# Patient Record
Sex: MALE | Race: WHITE | NOT HISPANIC OR LATINO | Employment: OTHER | URBAN - METROPOLITAN AREA
[De-identification: names, ages, dates, MRNs, and addresses within clinical notes are randomized per-mention and may not be internally consistent; named-entity substitution may affect disease eponyms.]

---

## 2017-04-26 ENCOUNTER — HISTORICAL (OUTPATIENT)
Dept: ADMINISTRATIVE | Facility: HOSPITAL | Age: 82
End: 2017-04-26

## 2017-07-26 ENCOUNTER — HISTORICAL (OUTPATIENT)
Dept: SURGERY | Facility: HOSPITAL | Age: 82
End: 2017-07-26

## 2017-07-26 LAB
ABS NEUT (OLG): 3 X10(3)/MCL (ref 1.5–6.9)
ALBUMIN SERPL-MCNC: 3.3 GM/DL (ref 3.4–5)
ALBUMIN/GLOB SERPL: 0.9 RATIO
ALP SERPL-CCNC: 67 UNIT/L (ref 30–113)
ALT SERPL-CCNC: 20 UNIT/L (ref 10–45)
APTT PPP: 25.5 SECOND(S) (ref 25–35)
AST SERPL-CCNC: 19 UNIT/L (ref 15–37)
BASOPHILS # BLD AUTO: 0.1 X10(3)/MCL (ref 0–0.1)
BASOPHILS NFR BLD AUTO: 1 % (ref 0–1)
BILIRUB SERPL-MCNC: 0.9 MG/DL (ref 0.1–0.9)
BILIRUBIN DIRECT+TOT PNL SERPL-MCNC: 0.3 MG/DL (ref 0–0.3)
BILIRUBIN DIRECT+TOT PNL SERPL-MCNC: 0.6 MG/DL
BUN SERPL-MCNC: 21 MG/DL (ref 10–20)
CALCIUM SERPL-MCNC: 8.6 MG/DL (ref 8–10.5)
CHLORIDE SERPL-SCNC: 107 MMOL/L (ref 100–108)
CO2 SERPL-SCNC: 34 MMOL/L (ref 21–35)
CREAT SERPL-MCNC: 1.21 MG/DL (ref 0.7–1.3)
EOSINOPHIL # BLD AUTO: 0.4 X10(3)/MCL (ref 0–0.6)
EOSINOPHIL NFR BLD AUTO: 6 % (ref 0–5)
ERYTHROCYTE [DISTWIDTH] IN BLOOD BY AUTOMATED COUNT: 13.6 % (ref 11.5–17)
GLOBULIN SER-MCNC: 3.6 GM/DL
GLUCOSE SERPL-MCNC: 90 MG/DL (ref 75–116)
HCT VFR BLD AUTO: 44.9 % (ref 42–52)
HGB BLD-MCNC: 15.1 GM/DL (ref 14–18)
INR PPP: 1.2 (ref 0–1.2)
LYMPHOCYTES # BLD AUTO: 2 X10(3)/MCL (ref 0.5–4.1)
LYMPHOCYTES NFR BLD AUTO: 31.8 % (ref 15–40)
MCH RBC QN AUTO: 33 PG (ref 27–34)
MCHC RBC AUTO-ENTMCNC: 34 GM/DL (ref 31–36)
MCV RBC AUTO: 97 FL (ref 80–99)
MONOCYTES # BLD AUTO: 0.8 X10(3)/MCL (ref 0–1.1)
MONOCYTES NFR BLD AUTO: 13 % (ref 4–12)
NEUTROPHILS # BLD AUTO: 3 X10(3)/MCL (ref 1.5–6.9)
NEUTROPHILS NFR BLD AUTO: 48 % (ref 43–75)
PLATELET # BLD AUTO: 154 X10(3)/MCL (ref 140–400)
PMV BLD AUTO: 11.9 FL (ref 6.8–10)
POTASSIUM SERPL-SCNC: 4.8 MMOL/L (ref 3.6–5.2)
PROT SERPL-MCNC: 6.9 GM/DL (ref 6.4–8.2)
PROTHROMBIN TIME: 12.1 SECOND(S) (ref 9–12)
RBC # BLD AUTO: 4.63 X10(6)/MCL (ref 4.7–6.1)
SODIUM SERPL-SCNC: 143 MMOL/L (ref 135–145)
WBC # SPEC AUTO: 6.2 X10(3)/MCL (ref 4.5–11.5)

## 2017-07-28 ENCOUNTER — HISTORICAL (OUTPATIENT)
Dept: ANESTHESIOLOGY | Facility: HOSPITAL | Age: 82
End: 2017-07-28

## 2018-05-14 ENCOUNTER — HISTORICAL (OUTPATIENT)
Dept: SURGERY | Facility: HOSPITAL | Age: 83
End: 2018-05-14

## 2018-05-14 LAB
ABS NEUT (OLG): 2.4 X10(3)/MCL (ref 1.5–6.9)
ALBUMIN SERPL-MCNC: 3.6 GM/DL (ref 3.4–5)
ALBUMIN/GLOB SERPL: 1.2 RATIO
ALP SERPL-CCNC: 72 UNIT/L (ref 30–113)
ALT SERPL-CCNC: 35 UNIT/L (ref 10–45)
ANISOCYTOSIS BLD QL SMEAR: ABNORMAL
APTT PPP: 24.7 SECOND(S) (ref 25–35)
AST SERPL-CCNC: 30 UNIT/L (ref 15–37)
BASOPHILS NFR BLD MANUAL: 0 % (ref 0–1)
BILIRUB SERPL-MCNC: 0.6 MG/DL (ref 0.1–0.9)
BILIRUBIN DIRECT+TOT PNL SERPL-MCNC: 0.2 MG/DL (ref 0–0.3)
BILIRUBIN DIRECT+TOT PNL SERPL-MCNC: 0.4 MG/DL
BUN SERPL-MCNC: 25 MG/DL (ref 10–20)
CALCIUM SERPL-MCNC: 8.1 MG/DL (ref 8–10.5)
CHLORIDE SERPL-SCNC: 106 MMOL/L (ref 100–108)
CO2 SERPL-SCNC: 25 MMOL/L (ref 21–35)
CREAT SERPL-MCNC: 1.26 MG/DL (ref 0.7–1.3)
EOSINOPHIL NFR BLD MANUAL: 4 % (ref 0–5)
ERYTHROCYTE [DISTWIDTH] IN BLOOD BY AUTOMATED COUNT: 16.7 % (ref 11.5–17)
GLOBULIN SER-MCNC: 3 GM/DL
GLUCOSE SERPL-MCNC: 87 MG/DL (ref 75–116)
GRANULOCYTES NFR BLD MANUAL: 56 % (ref 47–80)
HCT VFR BLD AUTO: 30.2 % (ref 42–52)
HGB BLD-MCNC: 9.2 GM/DL (ref 14–18)
HYPOCHROMIA BLD QL SMEAR: ABNORMAL
INR PPP: 1 (ref 0–1.2)
LYMPHOCYTES NFR BLD MANUAL: 29 % (ref 15–40)
MACROCYTES BLD QL SMEAR: ABNORMAL
MCH RBC QN AUTO: 28 PG (ref 27–34)
MCHC RBC AUTO-ENTMCNC: 30 GM/DL (ref 31–36)
MCV RBC AUTO: 91 FL (ref 80–99)
MONOCYTES NFR BLD MANUAL: 11 % (ref 4–12)
PLATELET # BLD AUTO: 233 X10(3)/MCL (ref 140–400)
PLATELET # BLD EST: ADEQUATE 10*3/UL
PMV BLD AUTO: 10.8 FL (ref 6.8–10)
POTASSIUM SERPL-SCNC: 4.8 MMOL/L (ref 3.6–5.2)
PROT SERPL-MCNC: 6.6 GM/DL (ref 6.4–8.2)
PROTHROMBIN TIME: 10.9 SECOND(S) (ref 9–12)
RBC # BLD AUTO: 3.31 X10(6)/MCL (ref 4.7–6.1)
RBC MORPH BLD: ABNORMAL
SODIUM SERPL-SCNC: 140 MMOL/L (ref 135–145)
WBC # SPEC AUTO: 5.4 X10(3)/MCL (ref 4.5–11.5)

## 2018-06-18 ENCOUNTER — HISTORICAL (OUTPATIENT)
Dept: ANESTHESIOLOGY | Facility: HOSPITAL | Age: 83
End: 2018-06-18

## 2018-11-15 ENCOUNTER — HISTORICAL (OUTPATIENT)
Dept: LAB | Facility: HOSPITAL | Age: 83
End: 2018-11-15

## 2018-11-15 LAB
COLOR STL: NORMAL
CONSISTENCY STL: NORMAL
HEMOCCULT SP1 STL QL: NEGATIVE
HEMOCCULT SP2 STL QL: NEGATIVE

## 2022-03-23 ENCOUNTER — HISTORICAL (OUTPATIENT)
Dept: RADIOLOGY | Facility: HOSPITAL | Age: 87
End: 2022-03-23

## 2022-03-23 ENCOUNTER — HISTORICAL (OUTPATIENT)
Dept: ADMINISTRATIVE | Facility: HOSPITAL | Age: 87
End: 2022-03-23

## 2022-04-30 NOTE — OP NOTE
ADMITTING DIAGNOSIS:  Melenic stools on Protonix.    PROCEDURE:  Esophagogastroduodenoscopy with biopsy.    POSTOPERATIVE DIAGNOSES:    1. Normal duodenum in all 4 portions and into the jejunum.  2. Mild gastritis of the antrum, fundus, and cardia of the stomach.  3. 1 cm hiatal hernia, Z-line at 44 cm.  4. Normal esophagus, cricopharyngeus muscle, and vocal cords.    INDICATIONS FOR PROCEDURE:  The patient is an 87-year-old male referred by Dr. Gerry Handy for EGD.  Had a proctoscopy done recently for melenic stools.  Was referred by Dr. Munguia to Dr. Handy for bleeding per rectum.  The patient has had PPH stapling in the past, on 11/13/2017.  No more blood was noted at that time and then he started developing these melenic stools and there was concern for possible rebleed.  The patient was not grossly anemic, but there was concern as to his being on a blood thinner and having his bleeding coming from the upper gastrointestinal tract, especially since the proctoscopy was negative.    DESCRIPTION OF PROCEDURE:  The patient underwent sedation and examination of the esophagus, stomach, and duodenum.  The duodenum was normal in all 4 portions and into the jejunum.  The pylorus was intubated without difficulty.  Antrum, fundus, and cardia of stomach had some mild gastritis.  Biopsy was taken with the cold biopsy forceps.  There was a 1 cm hiatal hernia Z-line at 44 cm.  The esophagus, cricopharyngeus muscle, vocal cords were normal throughout.  No other abnormalities were seen.  Overall, the patient did very well, had no problems or difficulties.       I appreciate the consultation referral from Dr. Munguia and Dr. Handy and will notify him of all my findings.        BBS/MODL   DD: 06/18/2018 1716   DT: 06/18/2018 1810  Job # [id number]    cc: Dr. Ole Munguia

## 2022-04-30 NOTE — OP NOTE
ADMITTING DIAGNOSIS:  Bleeding symptomatic internal hemorrhoids, grade 4 type, prolapsing.    PROCEDURE:  PPH stapling of grade 4 prolapsing symptomatic internal hemorrhoids.    BRIEF HISTORY:  The patient is an 86-year-old male referred for bleeding hemorrhoids, had a colonoscopy that confirmed it on 07/18/2017, along with digital exam.  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  He was referred for possibility of hemorrhoidectomy.    PROCEDURE IN DETAIL:  Patient was placed under general anesthetic, prone chente-knife position.  Had an anal dilator inserted with insertion of an anal probe and then pursestring suturing of the anorectal mucosa above the dentate line.  The patient underwent resection with a 33 mm CovPure life renalen PPH stapler using the 2nd rung on the anvil.  The patient had good resection.  A circumferential mucosal proctectomy was performed.  No problems were encountered.  The anorectal tissue had healed and sealed very nicely with the staple line.  A couple of small 2-0 plain gut sutures were used for stitching the staple line.  No other abnormalities were encountered.  Overall the patient did very well, had no problems or difficulties, was awakened and sent to Recovery in good condition.       I appreciate the consultation referral from Dr. Munguia and will notify him of my findings.        BBS/MODL   DD: 07/28/2017 1205   DT: 07/28/2017 1232  Job # [id number]    cc: Dr. Munguia

## 2022-09-16 ENCOUNTER — HOSPITAL ENCOUNTER (INPATIENT)
Facility: HOSPITAL | Age: 87
LOS: 12 days | Discharge: NURSING, REHAB OR LONG-TERM CARE | DRG: 521 | End: 2022-09-28
Attending: INTERNAL MEDICINE | Admitting: INTERNAL MEDICINE
Payer: MEDICARE

## 2022-09-16 DIAGNOSIS — I48.20 CHRONIC A-FIB: ICD-10-CM
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
## 2022-09-16 DIAGNOSIS — S72.009A HIP FRACTURE: ICD-10-CM

## 2022-09-16 DIAGNOSIS — S72.001A CLOSED FRACTURE OF RIGHT HIP, INITIAL ENCOUNTER: ICD-10-CM

## 2022-09-16 DIAGNOSIS — S72.144A CLOSED NONDISPLACED INTERTROCHANTERIC FRACTURE OF RIGHT FEMUR, INITIAL ENCOUNTER: Primary | ICD-10-CM

## 2022-09-16 LAB
ANION GAP SERPL CALC-SCNC: 10 MEQ/L
BASOPHILS # BLD AUTO: 0.07 X10(3)/MCL (ref 0–0.2)
BASOPHILS NFR BLD AUTO: 0.7 %
BUN SERPL-MCNC: 32 MG/DL (ref 8.4–25.7)
CALCIUM SERPL-MCNC: 9.1 MG/DL (ref 8.8–10)
CHLORIDE SERPL-SCNC: 107 MMOL/L (ref 98–111)
CO2 SERPL-SCNC: 27 MMOL/L (ref 23–31)
CREAT SERPL-MCNC: 1.49 MG/DL (ref 0.73–1.18)
CREAT/UREA NIT SERPL: 21
EOSINOPHIL # BLD AUTO: 0.96 X10(3)/MCL (ref 0–0.9)
EOSINOPHIL NFR BLD AUTO: 9.3 %
ERYTHROCYTE [DISTWIDTH] IN BLOOD BY AUTOMATED COUNT: 14.2 % (ref 11.5–17)
GFR SERPLBLD CREATININE-BSD FMLA CKD-EPI: 44 MLS/MIN/1.73/M2
GLUCOSE SERPL-MCNC: 131 MG/DL (ref 75–121)
HCT VFR BLD AUTO: 46.7 % (ref 42–52)
HGB BLD-MCNC: 15.2 GM/DL (ref 14–18)
IMM GRANULOCYTES # BLD AUTO: 0.12 X10(3)/MCL (ref 0–0.04)
IMM GRANULOCYTES NFR BLD AUTO: 1.2 %
INR BLD: 1.15 (ref 0–1.3)
LYMPHOCYTES # BLD AUTO: 1.97 X10(3)/MCL (ref 0.6–4.6)
LYMPHOCYTES NFR BLD AUTO: 19.1 %
MCH RBC QN AUTO: 32.7 PG (ref 27–31)
MCHC RBC AUTO-ENTMCNC: 32.5 MG/DL (ref 33–36)
MCV RBC AUTO: 100.4 FL (ref 80–94)
MONOCYTES # BLD AUTO: 0.79 X10(3)/MCL (ref 0.1–1.3)
MONOCYTES NFR BLD AUTO: 7.7 %
NEUTROPHILS # BLD AUTO: 6.4 X10(3)/MCL (ref 2.1–9.2)
NEUTROPHILS NFR BLD AUTO: 62 %
PLATELET # BLD AUTO: 118 X10(3)/MCL (ref 130–400)
PMV BLD AUTO: 12.5 FL (ref 7.4–10.4)
POTASSIUM SERPL-SCNC: 4.5 MMOL/L (ref 3.5–5.1)
PROTHROMBIN TIME: 14.6 SECONDS (ref 12.5–14.5)
RBC # BLD AUTO: 4.65 X10(6)/MCL (ref 4.7–6.1)
SODIUM SERPL-SCNC: 144 MMOL/L (ref 132–146)
WBC # SPEC AUTO: 10.3 X10(3)/MCL (ref 4.5–11.5)

## 2022-09-16 PROCEDURE — 63600175 PHARM REV CODE 636 W HCPCS: Performed by: INTERNAL MEDICINE

## 2022-09-16 PROCEDURE — 85610 PROTHROMBIN TIME: CPT | Performed by: INTERNAL MEDICINE

## 2022-09-16 PROCEDURE — 36415 COLL VENOUS BLD VENIPUNCTURE: CPT | Performed by: INTERNAL MEDICINE

## 2022-09-16 PROCEDURE — 51702 INSERT TEMP BLADDER CATH: CPT

## 2022-09-16 PROCEDURE — 96376 TX/PRO/DX INJ SAME DRUG ADON: CPT

## 2022-09-16 PROCEDURE — 25000003 PHARM REV CODE 250: Performed by: INTERNAL MEDICINE

## 2022-09-16 PROCEDURE — 96374 THER/PROPH/DIAG INJ IV PUSH: CPT

## 2022-09-16 PROCEDURE — 85025 COMPLETE CBC W/AUTO DIFF WBC: CPT | Performed by: INTERNAL MEDICINE

## 2022-09-16 PROCEDURE — 99285 EMERGENCY DEPT VISIT HI MDM: CPT | Mod: 25

## 2022-09-16 PROCEDURE — 96375 TX/PRO/DX INJ NEW DRUG ADDON: CPT

## 2022-09-16 PROCEDURE — 11000001 HC ACUTE MED/SURG PRIVATE ROOM

## 2022-09-16 PROCEDURE — 80048 BASIC METABOLIC PNL TOTAL CA: CPT | Performed by: INTERNAL MEDICINE

## 2022-09-16 RX ORDER — SODIUM CHLORIDE 9 MG/ML
1000 INJECTION, SOLUTION INTRAVENOUS
Status: COMPLETED | OUTPATIENT
Start: 2022-09-16 | End: 2022-09-16

## 2022-09-16 RX ORDER — FUROSEMIDE 20 MG/1
20 TABLET ORAL EVERY MORNING
Status: ON HOLD | COMMUNITY
Start: 2022-08-17 | End: 2022-09-28

## 2022-09-16 RX ORDER — OXCARBAZEPINE 300 MG/1
300 TABLET, FILM COATED ORAL DAILY
Status: DISCONTINUED | OUTPATIENT
Start: 2022-09-17 | End: 2022-09-16

## 2022-09-16 RX ORDER — OXCARBAZEPINE 150 MG/1
150 TABLET, FILM COATED ORAL DAILY
Status: DISCONTINUED | OUTPATIENT
Start: 2022-09-17 | End: 2022-09-28 | Stop reason: HOSPADM

## 2022-09-16 RX ORDER — PROCHLORPERAZINE EDISYLATE 5 MG/ML
5 INJECTION INTRAMUSCULAR; INTRAVENOUS EVERY 6 HOURS PRN
Status: DISCONTINUED | OUTPATIENT
Start: 2022-09-16 | End: 2022-09-17

## 2022-09-16 RX ORDER — HYDROMORPHONE HYDROCHLORIDE 2 MG/ML
1 INJECTION, SOLUTION INTRAMUSCULAR; INTRAVENOUS; SUBCUTANEOUS
Status: COMPLETED | OUTPATIENT
Start: 2022-09-16 | End: 2022-09-16

## 2022-09-16 RX ORDER — ONDANSETRON 2 MG/ML
4 INJECTION INTRAMUSCULAR; INTRAVENOUS EVERY 8 HOURS PRN
Status: DISCONTINUED | OUTPATIENT
Start: 2022-09-16 | End: 2022-09-28 | Stop reason: HOSPADM

## 2022-09-16 RX ORDER — ONDANSETRON 2 MG/ML
4 INJECTION INTRAMUSCULAR; INTRAVENOUS
Status: COMPLETED | OUTPATIENT
Start: 2022-09-16 | End: 2022-09-16

## 2022-09-16 RX ORDER — OXCARBAZEPINE 150 MG/1
300 TABLET, FILM COATED ORAL 2 TIMES DAILY
Status: ON HOLD | COMMUNITY
Start: 2022-06-20 | End: 2022-09-28

## 2022-09-16 RX ORDER — LORAZEPAM 2 MG/ML
1 INJECTION INTRAMUSCULAR
Status: COMPLETED | OUTPATIENT
Start: 2022-09-16 | End: 2022-09-16

## 2022-09-16 RX ORDER — AMOXICILLIN 250 MG
1 CAPSULE ORAL 2 TIMES DAILY
Status: DISCONTINUED | OUTPATIENT
Start: 2022-09-16 | End: 2022-09-21

## 2022-09-16 RX ORDER — HYDROMORPHONE HYDROCHLORIDE 2 MG/ML
1 INJECTION, SOLUTION INTRAMUSCULAR; INTRAVENOUS; SUBCUTANEOUS EVERY 4 HOURS PRN
Status: DISCONTINUED | OUTPATIENT
Start: 2022-09-16 | End: 2022-09-17

## 2022-09-16 RX ORDER — SODIUM CHLORIDE 9 MG/ML
1000 INJECTION, SOLUTION INTRAVENOUS CONTINUOUS
Status: DISCONTINUED | OUTPATIENT
Start: 2022-09-16 | End: 2022-09-17

## 2022-09-16 RX ORDER — HYDROMORPHONE HYDROCHLORIDE 2 MG/ML
1 INJECTION, SOLUTION INTRAMUSCULAR; INTRAVENOUS; SUBCUTANEOUS
Status: DISCONTINUED | OUTPATIENT
Start: 2022-09-16 | End: 2022-09-16

## 2022-09-16 RX ORDER — OXYCODONE HYDROCHLORIDE 5 MG/1
5 TABLET ORAL EVERY 4 HOURS PRN
Status: DISCONTINUED | OUTPATIENT
Start: 2022-09-16 | End: 2022-09-28 | Stop reason: HOSPADM

## 2022-09-16 RX ORDER — TALC
6 POWDER (GRAM) TOPICAL NIGHTLY PRN
Status: DISCONTINUED | OUTPATIENT
Start: 2022-09-16 | End: 2022-09-28 | Stop reason: HOSPADM

## 2022-09-16 RX ORDER — SODIUM CHLORIDE 0.9 % (FLUSH) 0.9 %
10 SYRINGE (ML) INJECTION
Status: DISCONTINUED | OUTPATIENT
Start: 2022-09-16 | End: 2022-09-28 | Stop reason: HOSPADM

## 2022-09-16 RX ORDER — ENOXAPARIN SODIUM 100 MG/ML
30 INJECTION SUBCUTANEOUS EVERY 24 HOURS
Status: DISCONTINUED | OUTPATIENT
Start: 2022-09-16 | End: 2022-09-28 | Stop reason: HOSPADM

## 2022-09-16 RX ADMIN — LORAZEPAM 1 MG: 2 INJECTION INTRAMUSCULAR; INTRAVENOUS at 09:09

## 2022-09-16 RX ADMIN — ONDANSETRON 4 MG: 2 INJECTION INTRAMUSCULAR; INTRAVENOUS at 08:09

## 2022-09-16 RX ADMIN — HYDROMORPHONE HYDROCHLORIDE 1 MG: 2 INJECTION, SOLUTION INTRAMUSCULAR; INTRAVENOUS; SUBCUTANEOUS at 08:09

## 2022-09-16 RX ADMIN — SODIUM CHLORIDE 1000 ML: 9 INJECTION, SOLUTION INTRAVENOUS at 08:09

## 2022-09-17 ENCOUNTER — ANESTHESIA (OUTPATIENT)
Dept: SURGERY | Facility: HOSPITAL | Age: 87
DRG: 521 | End: 2022-09-17
Payer: MEDICARE

## 2022-09-17 ENCOUNTER — ANESTHESIA EVENT (OUTPATIENT)
Dept: SURGERY | Facility: HOSPITAL | Age: 87
DRG: 521 | End: 2022-09-17
Payer: MEDICARE

## 2022-09-17 PROBLEM — I48.0 PAROXYSMAL A-FIB: Status: ACTIVE | Noted: 2022-09-17

## 2022-09-17 PROBLEM — R21 RASH: Status: ACTIVE | Noted: 2022-09-17

## 2022-09-17 LAB
ABORH RETYPE: NORMAL
APPEARANCE UR: CLEAR
BACTERIA #/AREA URNS AUTO: NORMAL /HPF
BILIRUB UR QL STRIP.AUTO: NEGATIVE MG/DL
COLOR UR AUTO: YELLOW
GLUCOSE UR QL STRIP.AUTO: NEGATIVE MG/DL
GROUP & RH: NORMAL
INDIRECT COOMBS GEL: NORMAL
KETONES UR QL STRIP.AUTO: NEGATIVE MG/DL
LEUKOCYTE ESTERASE UR QL STRIP.AUTO: NEGATIVE UNIT/L
NITRITE UR QL STRIP.AUTO: NEGATIVE
PH UR STRIP.AUTO: 5.5 [PH]
PROT UR QL STRIP.AUTO: 30 MG/DL
RBC #/AREA URNS AUTO: NORMAL /HPF
RBC UR QL AUTO: ABNORMAL UNIT/L
SP GR UR STRIP.AUTO: 1.02
SQUAMOUS #/AREA URNS AUTO: NORMAL /HPF
UROBILINOGEN UR STRIP-ACNC: 1 MG/DL
WBC #/AREA URNS AUTO: NORMAL /HPF

## 2022-09-17 PROCEDURE — 37000009 HC ANESTHESIA EA ADD 15 MINS: Performed by: SPECIALIST

## 2022-09-17 PROCEDURE — 11000001 HC ACUTE MED/SURG PRIVATE ROOM

## 2022-09-17 PROCEDURE — 63600175 PHARM REV CODE 636 W HCPCS: Performed by: INTERNAL MEDICINE

## 2022-09-17 PROCEDURE — C1776 JOINT DEVICE (IMPLANTABLE): HCPCS | Performed by: SPECIALIST

## 2022-09-17 PROCEDURE — 71000033 HC RECOVERY, INTIAL HOUR: Performed by: SPECIALIST

## 2022-09-17 PROCEDURE — 99900035 HC TECH TIME PER 15 MIN (STAT)

## 2022-09-17 PROCEDURE — 36415 COLL VENOUS BLD VENIPUNCTURE: CPT | Performed by: SPECIALIST

## 2022-09-17 PROCEDURE — 25000003 PHARM REV CODE 250: Performed by: NURSE ANESTHETIST, CERTIFIED REGISTERED

## 2022-09-17 PROCEDURE — 36415 COLL VENOUS BLD VENIPUNCTURE: CPT | Performed by: INTERNAL MEDICINE

## 2022-09-17 PROCEDURE — 86901 BLOOD TYPING SEROLOGIC RH(D): CPT | Performed by: SPECIALIST

## 2022-09-17 PROCEDURE — 93005 ELECTROCARDIOGRAM TRACING: CPT

## 2022-09-17 PROCEDURE — 81001 URINALYSIS AUTO W/SCOPE: CPT | Performed by: INTERNAL MEDICINE

## 2022-09-17 PROCEDURE — 63600175 PHARM REV CODE 636 W HCPCS: Performed by: SPECIALIST

## 2022-09-17 PROCEDURE — 25000003 PHARM REV CODE 250: Performed by: SPECIALIST

## 2022-09-17 PROCEDURE — 37000008 HC ANESTHESIA 1ST 15 MINUTES: Performed by: SPECIALIST

## 2022-09-17 PROCEDURE — 36000710: Performed by: SPECIALIST

## 2022-09-17 PROCEDURE — 63600175 PHARM REV CODE 636 W HCPCS: Performed by: NURSE ANESTHETIST, CERTIFIED REGISTERED

## 2022-09-17 PROCEDURE — 36000711: Performed by: SPECIALIST

## 2022-09-17 PROCEDURE — 25000003 PHARM REV CODE 250: Performed by: INTERNAL MEDICINE

## 2022-09-17 PROCEDURE — 94761 N-INVAS EAR/PLS OXIMETRY MLT: CPT

## 2022-09-17 PROCEDURE — 27201423 OPTIME MED/SURG SUP & DEVICES STERILE SUPPLY: Performed by: SPECIALIST

## 2022-09-17 PROCEDURE — 27000221 HC OXYGEN, UP TO 24 HOURS

## 2022-09-17 DEVICE — IMPLANTABLE DEVICE: Type: IMPLANTABLE DEVICE | Site: HIP | Status: FUNCTIONAL

## 2022-09-17 DEVICE — STEM FEM TAPER HIP SZ 6: Type: IMPLANTABLE DEVICE | Site: HIP | Status: FUNCTIONAL

## 2022-09-17 DEVICE — SPACER FEM -0 12/14 TAPER END: Type: IMPLANTABLE DEVICE | Site: HIP | Status: FUNCTIONAL

## 2022-09-17 RX ORDER — CEFAZOLIN SODIUM 2 G/50ML
2 SOLUTION INTRAVENOUS ONCE
Status: COMPLETED | OUTPATIENT
Start: 2022-09-17 | End: 2022-09-17

## 2022-09-17 RX ORDER — POLYETHYLENE GLYCOL 3350 17 G/17G
17 POWDER, FOR SOLUTION ORAL DAILY
Status: DISCONTINUED | OUTPATIENT
Start: 2022-09-17 | End: 2022-09-28 | Stop reason: HOSPADM

## 2022-09-17 RX ORDER — LOPERAMIDE HYDROCHLORIDE 2 MG/1
4 CAPSULE ORAL ONCE
Status: DISCONTINUED | OUTPATIENT
Start: 2022-09-17 | End: 2022-09-22

## 2022-09-17 RX ORDER — ONDANSETRON 2 MG/ML
4 INJECTION INTRAMUSCULAR; INTRAVENOUS EVERY 12 HOURS PRN
Status: DISCONTINUED | OUTPATIENT
Start: 2022-09-17 | End: 2022-09-28 | Stop reason: HOSPADM

## 2022-09-17 RX ORDER — SODIUM CHLORIDE 0.9 % (FLUSH) 0.9 %
3 SYRINGE (ML) INJECTION
Status: DISCONTINUED | OUTPATIENT
Start: 2022-09-17 | End: 2022-09-28 | Stop reason: HOSPADM

## 2022-09-17 RX ORDER — DEXAMETHASONE SODIUM PHOSPHATE 4 MG/ML
INJECTION, SOLUTION INTRA-ARTICULAR; INTRALESIONAL; INTRAMUSCULAR; INTRAVENOUS; SOFT TISSUE
Status: DISCONTINUED | OUTPATIENT
Start: 2022-09-17 | End: 2022-09-17

## 2022-09-17 RX ORDER — HYDROCODONE BITARTRATE AND ACETAMINOPHEN 5; 325 MG/1; MG/1
1 TABLET ORAL EVERY 4 HOURS PRN
Status: DISCONTINUED | OUTPATIENT
Start: 2022-09-17 | End: 2022-09-28 | Stop reason: HOSPADM

## 2022-09-17 RX ORDER — ACETAMINOPHEN 325 MG/1
650 TABLET ORAL EVERY 4 HOURS PRN
Status: DISCONTINUED | OUTPATIENT
Start: 2022-09-17 | End: 2022-09-28 | Stop reason: HOSPADM

## 2022-09-17 RX ORDER — HYDROCODONE BITARTRATE AND ACETAMINOPHEN 10; 325 MG/1; MG/1
1 TABLET ORAL EVERY 6 HOURS PRN
Status: DISCONTINUED | OUTPATIENT
Start: 2022-09-17 | End: 2022-09-22

## 2022-09-17 RX ORDER — BUPIVACAINE HYDROCHLORIDE 2.5 MG/ML
INJECTION, SOLUTION EPIDURAL; INFILTRATION; INTRACAUDAL
Status: DISCONTINUED | OUTPATIENT
Start: 2022-09-17 | End: 2022-09-17 | Stop reason: HOSPADM

## 2022-09-17 RX ORDER — ONDANSETRON 2 MG/ML
INJECTION INTRAMUSCULAR; INTRAVENOUS
Status: DISCONTINUED | OUTPATIENT
Start: 2022-09-17 | End: 2022-09-17

## 2022-09-17 RX ORDER — FENTANYL CITRATE 50 UG/ML
INJECTION, SOLUTION INTRAMUSCULAR; INTRAVENOUS
Status: DISCONTINUED | OUTPATIENT
Start: 2022-09-17 | End: 2022-09-17

## 2022-09-17 RX ORDER — CEFAZOLIN SODIUM 2 G/50ML
2 SOLUTION INTRAVENOUS
Status: COMPLETED | OUTPATIENT
Start: 2022-09-17 | End: 2022-09-17

## 2022-09-17 RX ORDER — MUPIROCIN 20 MG/G
OINTMENT TOPICAL 2 TIMES DAILY
Status: ACTIVE | OUTPATIENT
Start: 2022-09-17 | End: 2022-09-22

## 2022-09-17 RX ORDER — ACETAMINOPHEN 10 MG/ML
1000 INJECTION, SOLUTION INTRAVENOUS ONCE
Status: COMPLETED | OUTPATIENT
Start: 2022-09-17 | End: 2022-09-17

## 2022-09-17 RX ORDER — ACETAMINOPHEN 10 MG/ML
INJECTION, SOLUTION INTRAVENOUS
Status: DISCONTINUED | OUTPATIENT
Start: 2022-09-17 | End: 2022-09-17

## 2022-09-17 RX ORDER — DIPHENHYDRAMINE HYDROCHLORIDE 50 MG/ML
25 INJECTION INTRAMUSCULAR; INTRAVENOUS EVERY 6 HOURS PRN
Status: CANCELLED | OUTPATIENT
Start: 2022-09-17

## 2022-09-17 RX ORDER — SODIUM CHLORIDE 9 MG/ML
1000 INJECTION, SOLUTION INTRAVENOUS CONTINUOUS
Status: ACTIVE | OUTPATIENT
Start: 2022-09-17 | End: 2022-09-17

## 2022-09-17 RX ORDER — FAMOTIDINE 10 MG/ML
20 INJECTION INTRAVENOUS DAILY
Status: DISCONTINUED | OUTPATIENT
Start: 2022-09-17 | End: 2022-09-28 | Stop reason: HOSPADM

## 2022-09-17 RX ORDER — BISACODYL 10 MG
10 SUPPOSITORY, RECTAL RECTAL DAILY PRN
Status: DISCONTINUED | OUTPATIENT
Start: 2022-09-17 | End: 2022-09-28 | Stop reason: HOSPADM

## 2022-09-17 RX ORDER — CEFAZOLIN SODIUM 2 G/50ML
2 SOLUTION INTRAVENOUS
Status: DISCONTINUED | OUTPATIENT
Start: 2022-09-17 | End: 2022-09-17

## 2022-09-17 RX ORDER — PROPOFOL 10 MG/ML
VIAL (ML) INTRAVENOUS
Status: DISCONTINUED | OUTPATIENT
Start: 2022-09-17 | End: 2022-09-17

## 2022-09-17 RX ORDER — ONDANSETRON 2 MG/ML
4 INJECTION INTRAMUSCULAR; INTRAVENOUS DAILY PRN
Status: CANCELLED | OUTPATIENT
Start: 2022-09-17

## 2022-09-17 RX ORDER — TRANEXAMIC ACID 100 MG/ML
INJECTION, SOLUTION INTRAVENOUS
Status: DISCONTINUED | OUTPATIENT
Start: 2022-09-17 | End: 2022-09-17

## 2022-09-17 RX ORDER — HYDROMORPHONE HYDROCHLORIDE 2 MG/ML
0.2 INJECTION, SOLUTION INTRAMUSCULAR; INTRAVENOUS; SUBCUTANEOUS EVERY 5 MIN PRN
Status: CANCELLED | OUTPATIENT
Start: 2022-09-17

## 2022-09-17 RX ORDER — SODIUM CHLORIDE, SODIUM LACTATE, POTASSIUM CHLORIDE, CALCIUM CHLORIDE 600; 310; 30; 20 MG/100ML; MG/100ML; MG/100ML; MG/100ML
INJECTION, SOLUTION INTRAVENOUS CONTINUOUS
Status: DISCONTINUED | OUTPATIENT
Start: 2022-09-17 | End: 2022-09-21

## 2022-09-17 RX ADMIN — MUPIROCIN: 20 OINTMENT TOPICAL at 09:09

## 2022-09-17 RX ADMIN — ENOXAPARIN SODIUM 30 MG: 30 INJECTION SUBCUTANEOUS at 04:09

## 2022-09-17 RX ADMIN — TRANEXAMIC ACID 1000 MG: 100 INJECTION, SOLUTION INTRAVENOUS at 11:09

## 2022-09-17 RX ADMIN — PROPOFOL 50 MG: 10 INJECTION, EMULSION INTRAVENOUS at 11:09

## 2022-09-17 RX ADMIN — BUPIVACAINE HYDROCHLORIDE 30 ML: 2.5 INJECTION, SOLUTION EPIDURAL; INFILTRATION; INTRACAUDAL; PERINEURAL at 11:09

## 2022-09-17 RX ADMIN — ACETAMINOPHEN 1000 MG: 10 INJECTION, SOLUTION INTRAVENOUS at 11:09

## 2022-09-17 RX ADMIN — SODIUM CHLORIDE, POTASSIUM CHLORIDE, SODIUM LACTATE AND CALCIUM CHLORIDE: 600; 310; 30; 20 INJECTION, SOLUTION INTRAVENOUS at 03:09

## 2022-09-17 RX ADMIN — CEFAZOLIN SODIUM 2 G: 2 SOLUTION INTRAVENOUS at 11:09

## 2022-09-17 RX ADMIN — FAMOTIDINE 20 MG: 10 INJECTION, SOLUTION INTRAVENOUS at 08:09

## 2022-09-17 RX ADMIN — HYDROCODONE BITARTRATE AND ACETAMINOPHEN 1 TABLET: 10; 325 TABLET ORAL at 11:09

## 2022-09-17 RX ADMIN — CEFAZOLIN SODIUM 2 G: 2 SOLUTION INTRAVENOUS at 10:09

## 2022-09-17 RX ADMIN — ACETAMINOPHEN 1000 MG: 10 INJECTION INTRAVENOUS at 03:09

## 2022-09-17 RX ADMIN — FENTANYL CITRATE 50 MCG: 50 INJECTION INTRAMUSCULAR; INTRAVENOUS at 11:09

## 2022-09-17 RX ADMIN — ONDANSETRON 4 MG: 2 INJECTION INTRAMUSCULAR; INTRAVENOUS at 11:09

## 2022-09-17 RX ADMIN — DEXAMETHASONE SODIUM PHOSPHATE 4 MG: 4 INJECTION, SOLUTION INTRA-ARTICULAR; INTRALESIONAL; INTRAMUSCULAR; INTRAVENOUS; SOFT TISSUE at 11:09

## 2022-09-17 NOTE — H&P
[facility] - Medical Surgical [facility] Medicine  History & Physical    Patient Name: Blaze Turner  MRN: [id number]  Patient Class: IP- Inpatient  Admission Date: 9/16/2022  Attending Physician: Cyndy Franco MD  Primary Care Provider: Gaudencio Munguia MD         Patient information was obtained from via telemed    Subjective:     Principal Problem:Closed fracture of right hip    Chief Complaint:   Chief Complaint   Patient presents with    Fall     Pt fell earlier today, initially c/o knee pain, currently c/o right hip pain. No shortening or rotation noted.         HPI: 89-year-old male with hx Dementia,  HTN, Trigeminal neuralgia, Afib, CKD stage 3 baseline Cr 1.5, brought in by his son after fall at home patient states he could not bear weight he was complaining of significant right hip pain so the son brought him to the emergency department.  No LOC, no chest pain, no F/C, no chest pain per daughter. PT takesTrileptal 150 mg daily per daughter. Pt has developed rash for past 5 days per daughter who stays with him. Pt was scratching and agitated he was give Ativan in Ed, now sleeping. Daughter at bedside. Pt is DNR per daughter          Past Medical History:   Diagnosis Date    Trigeminal neuralgia        History reviewed. No pertinent surgical history.    Review of patient's allergies indicates:  No Known Allergies    No current facility-administered medications on file prior to encounter.     Current Outpatient Medications on File Prior to Encounter   Medication Sig    furosemide (LASIX) 20 MG tablet Take 20 mg by mouth every morning.    OXcarbazepine (TRILEPTAL) 150 MG Tab Take 300 mg by mouth 2 (two) times a day.     Family History    None       Tobacco Use    Smoking status: Never    Smokeless tobacco: Never   Substance and Sexual Activity    Alcohol use: Never    Drug use: Never    Sexual activity: Not on file     Review of Systems   Unable to perform ROS: Patient unresponsive    Objective:     Vital Signs (Most Recent):  Temp: 98.2 °F (36.8 °C) (09/16/22 2019)  Pulse: 97 (09/16/22 2215)  Resp: 16 (09/16/22 2215)  BP: 124/68 (09/16/22 2200)  SpO2: 99 % (09/16/22 2215) Vital Signs (24h Range):  Temp:  [98.2 °F (36.8 °C)] 98.2 °F (36.8 °C)  Pulse:  [] 97  Resp:  [14-20] 16  SpO2:  [87 %-100 %] 99 %  BP: (124-165)/() 124/68     Weight: 67.6 kg (149 lb)  Body mass index is 21.38 kg/m².    Physical Exam  Constitutional:       Appearance: Normal appearance.      Comments: sleeping   HENT:      Head: Normocephalic and atraumatic.      Nose: Nose normal.   Cardiovascular:      Rate and Rhythm: Normal rate. Rhythm irregular.      Pulses: Normal pulses.      Heart sounds: Normal heart sounds.   Pulmonary:      Effort: Pulmonary effort is normal.      Comments: Diminished   Abdominal:      General: Abdomen is flat. Bowel sounds are normal.      Palpations: Abdomen is soft.   Musculoskeletal:      Cervical back: Normal range of motion.   Skin:     General: Skin is warm and dry.   Neurological:      Comments: Sleeping not following commend           Significant Labs: All pertinent labs within the past 24 hours have been reviewed.  ABGs: No results for input(s): PH, PCO2, HCO3, POCSATURATED, BE, TOTALHB, COHB, METHB, O2HB, POCFIO2, PO2 in the last 48 hours.  Bilirubin: No results for input(s): BILIDIR, BILIRUBINTOT, BILITOT in the last 720 hours.  Blood Culture: No results for input(s): LABBLOO in the last 48 hours.  BMP:   Recent Labs   Lab 09/16/22 2035      K 4.5   CO2 27   BUN 32.0*   CREATININE 1.49*   CALCIUM 9.1     CBC:   Recent Labs   Lab 09/16/22 2035   WBC 10.3   HGB 15.2   HCT 46.7   *     CMP:   Recent Labs   Lab 09/16/22 2035      K 4.5   CO2 27   BUN 32.0*   CREATININE 1.49*   CALCIUM 9.1     Cardiac Markers: No results for input(s): CKMB, MYOGLOBIN, BNP, TROPISTAT in the last 48 hours.  Coagulation:   Recent Labs   Lab 09/16/22 2035   INR 1.15      Lactic Acid: No results for input(s): LACTATE in the last 48 hours.  Lipase: No results for input(s): LIPASE in the last 48 hours.  Lipid Panel: No results for input(s): CHOL, HDL, LDLCALC, TRIG, CHOLHDL in the last 48 hours.  Magnesium: No results for input(s): MG in the last 48 hours.  POCT Glucose: No results for input(s): POCTGLUCOSE in the last 48 hours.  Prealbumin: No results for input(s): PREALBUMIN in the last 48 hours.  Respiratory Culture: No results for input(s): GSRESP, RESPIRATORYC in the last 48 hours.  Troponin: No results for input(s): TROPONINI in the last 48 hours.  TSH: No results for input(s): TSH in the last 4320 hours.  Urine Studies: No results for input(s): COLORU, APPEARANCEUA, PHUR, SPECGRAV, PROTEINUA, GLUCUA, KETONESU, BILIRUBINUA, OCCULTUA, NITRITE, UROBILINOGEN, LEUKOCYTESUR, RBCUA, WBCUA, BACTERIA, SQUAMEPITHEL, HYALINECASTS in the last 48 hours.    Invalid input(s): WRIGHTSUR    Significant Imaging:   Hip Xray  Impacted Rt intertrochanteric  fracture    Assessment/Plan:     * Closed fracture of right hip  Keep NPO, Continue with pain management. NPO after midnight. Ortho consulted for Surgery in AM      Rash  Likely due to meds, ? Trileptal, may apply cortisol cream PRN      Paroxysmal A-fib  Not on any meds, rate controlled      VTE Risk Mitigation (From admission, onward)         Ordered     enoxaparin injection 30 mg  Daily         09/16/22 2307     IP VTE HIGH RISK PATIENT  Once         09/16/22 2307     Place EUGENIA hose  Until discontinued         09/16/22 2307              CKD stage 3 at baseline  Trigeminal Neuralgia Continue Trileptal for now    Pt is DNR, SDM is his daughter Hiwot Herring  Pt is Seen and examined via telemed. Pt is in [facility]. I am in [place], [place]. Nursing staff assisted with evaluation of the patient. Software Audio/ Video   Pt is being admitted as an inpatient to the hispitalist service for further eval and treat    Cyndy Franco,  MD  Department of Hospital Medicine   [facility] - Medical Surgical Unit

## 2022-09-17 NOTE — ASSESSMENT & PLAN NOTE
Detailed options have been discussed with the family.  Given the fact he is still ambulatory and having pain.  And will offer him surgery of the right hip.  I recommend endoprosthesis versus percutaneous pinning because of his dementia.  They consented and understand the risk of surgery not excluding infection, bleeding, pain, scarring, pulmonary embolism, even death.  We will proceed with right hip endoprosthesis today.

## 2022-09-17 NOTE — HOSPITAL COURSE
89-year-old male admitted last night with a right impacted femoral neck fracture.  Does have dementia but is still ambulatory.  He is able to feed himself.  I have discussed options with the family and I would recommend a endoprosthesis on the right to stabilize and alleviate pain.  I do not think he will be compliant enough to offer percutaneous pinning.  The daughter has consented him for surgery and agrees with the plan.    Daughter is with him today.

## 2022-09-17 NOTE — ANESTHESIA PREPROCEDURE EVALUATION
09/17/2022  Blaze Turner is a 89 y.o., male.      Pre-op Assessment    I have reviewed the Patient Summary Reports.     I have reviewed the Nursing Notes. I have reviewed the NPO Status.   I have reviewed the Medications.     Review of Systems  Anesthesia Hx:  No problems with previous Anesthesia Denies Hx of Anesthetic complications  History of prior surgery of interest to airway management or planning: Previous anesthesia: General   Social:  No Alcohol Use, Non-Smoker    Hematology/Oncology:  Hematology Normal   Oncology Normal     EENT/Dental:EENT/Dental Normal   Cardiovascular:   Exercise tolerance: poor Dysrhythmias atrial fibrillation pts daughter denies history of chf but pt is on lasix secondary to fluid retention, also hist PAF not on blood thinners   Pulmonary:  Pulmonary Normal    Renal/:  Renal/ Normal     Hepatic/GI:  Hepatic/GI Normal    Musculoskeletal:   Arthritis  Trigeminal neuralgia   Neurological:   Neuromuscular Disease,  Dementia severe    Endocrine:  Endocrine Normal    Dermatological:   Pt with generalized rash of unknown origin and scratch marks, pts daughter denies starting any new meds/foods, or washing detergent   Psych:  Psychiatric Normal           Physical Exam  General: Lethargic and Cachexia    Airway:  Mallampati: unable to assess   TM Distance: Normal      Dental:  Edentulous    Chest/Lungs:  Clear to auscultation, Normal Respiratory Rate    Heart:  Rhythm: Irregularly Irregular    Abdomen:  Soft, Nontender    Musculoskeletal:  Tender Joint  Right hip  Skin:  Urticaria  Generalized rash of unknown origin      Anesthesia Plan  Type of Anesthesia, risks & benefits discussed:    Anesthesia Type: Spinal  Intra-op Monitoring Plan: Standard ASA Monitors  Post Op Pain Control Plan: multimodal analgesia  Induction:  IV  Informed Consent: Informed consent signed with the  Patient representative and all parties understand the risks and agree with anesthesia plan.  All questions answered. Patient consented to blood products? Yes  ASA Score: 3  Day of Surgery Review of History & Physical: H&P Update referred to the surgeon/provider.I have interviewed and examined the patient. I have reviewed the patient's H&P dated: There are no significant changes.     Ready For Surgery From Anesthesia Perspective.     .

## 2022-09-17 NOTE — SUBJECTIVE & OBJECTIVE
Past Medical History:   Diagnosis Date    Trigeminal neuralgia        History reviewed. No pertinent surgical history.    Review of patient's allergies indicates:  No Known Allergies    No current facility-administered medications on file prior to encounter.     Current Outpatient Medications on File Prior to Encounter   Medication Sig    furosemide (LASIX) 20 MG tablet Take 20 mg by mouth every morning.    OXcarbazepine (TRILEPTAL) 150 MG Tab Take 300 mg by mouth 2 (two) times a day.     Family History    None       Tobacco Use    Smoking status: Never    Smokeless tobacco: Never   Substance and Sexual Activity    Alcohol use: Never    Drug use: Never    Sexual activity: Not on file     Review of Systems   Unable to perform ROS: Patient unresponsive   Objective:     Vital Signs (Most Recent):  Temp: 98.2 °F (36.8 °C) (09/16/22 2019)  Pulse: 97 (09/16/22 2215)  Resp: 16 (09/16/22 2215)  BP: 124/68 (09/16/22 2200)  SpO2: 99 % (09/16/22 2215) Vital Signs (24h Range):  Temp:  [98.2 °F (36.8 °C)] 98.2 °F (36.8 °C)  Pulse:  [] 97  Resp:  [14-20] 16  SpO2:  [87 %-100 %] 99 %  BP: (124-165)/() 124/68     Weight: 67.6 kg (149 lb)  Body mass index is 21.38 kg/m².    Physical Exam  Constitutional:       Appearance: Normal appearance.      Comments: sleeping   HENT:      Head: Normocephalic and atraumatic.      Nose: Nose normal.   Cardiovascular:      Rate and Rhythm: Normal rate. Rhythm irregular.      Pulses: Normal pulses.      Heart sounds: Normal heart sounds.   Pulmonary:      Effort: Pulmonary effort is normal.      Comments: Diminished   Abdominal:      General: Abdomen is flat. Bowel sounds are normal.      Palpations: Abdomen is soft.   Musculoskeletal:      Cervical back: Normal range of motion.   Skin:     General: Skin is warm and dry.   Neurological:      Comments: Sleeping not following commend           Significant Labs: All pertinent labs within the past 24 hours have been reviewed.  ABGs: No  results for input(s): PH, PCO2, HCO3, POCSATURATED, BE, TOTALHB, COHB, METHB, O2HB, POCFIO2, PO2 in the last 48 hours.  Bilirubin: No results for input(s): BILIDIR, BILIRUBINTOT, BILITOT in the last 720 hours.  Blood Culture: No results for input(s): LABBLOO in the last 48 hours.  BMP:   Recent Labs   Lab 09/16/22 2035      K 4.5   CO2 27   BUN 32.0*   CREATININE 1.49*   CALCIUM 9.1     CBC:   Recent Labs   Lab 09/16/22 2035   WBC 10.3   HGB 15.2   HCT 46.7   *     CMP:   Recent Labs   Lab 09/16/22 2035      K 4.5   CO2 27   BUN 32.0*   CREATININE 1.49*   CALCIUM 9.1     Cardiac Markers: No results for input(s): CKMB, MYOGLOBIN, BNP, TROPISTAT in the last 48 hours.  Coagulation:   Recent Labs   Lab 09/16/22 2035   INR 1.15     Lactic Acid: No results for input(s): LACTATE in the last 48 hours.  Lipase: No results for input(s): LIPASE in the last 48 hours.  Lipid Panel: No results for input(s): CHOL, HDL, LDLCALC, TRIG, CHOLHDL in the last 48 hours.  Magnesium: No results for input(s): MG in the last 48 hours.  POCT Glucose: No results for input(s): POCTGLUCOSE in the last 48 hours.  Prealbumin: No results for input(s): PREALBUMIN in the last 48 hours.  Respiratory Culture: No results for input(s): GSRESP, RESPIRATORYC in the last 48 hours.  Troponin: No results for input(s): TROPONINI in the last 48 hours.  TSH: No results for input(s): TSH in the last 4320 hours.  Urine Studies: No results for input(s): COLORU, APPEARANCEUA, PHUR, SPECGRAV, PROTEINUA, GLUCUA, KETONESU, BILIRUBINUA, OCCULTUA, NITRITE, UROBILINOGEN, LEUKOCYTESUR, RBCUA, WBCUA, BACTERIA, SQUAMEPITHEL, HYALINECASTS in the last 48 hours.    Invalid input(s): WRIGHTSUR    Significant Imaging:   Hip Xray  Impacted Rt intertrochanteric  fracture

## 2022-09-17 NOTE — CONSULTS
[facility] Unit  Orthopedics  Consult Note    Patient Name: Blaze Turner  MRN: [id number]  Admission Date: 9/16/2022  Hospital Length of Stay: 1 days  Attending Provider: Gus Hewitt MD  Primary Care Provider: Gaudencio Munguia MD    Patient information was obtained from relative(s) and ER records.     Consults  Subjective:     Principal Problem:Closed fracture of right hip    Chief Complaint:   Chief Complaint   Patient presents with    Fall     Pt fell earlier today, initially c/o knee pain, currently c/o right hip pain. No shortening or rotation noted.         HPI: 89-year-old male was brought to the emergency room last night after a fall at home.  His son found him after a fall.  He is normally ambulatory but with his dementia is not always use his walker.  He was admitted by the hospitalist.  Past medical history is pertinent for dementia, atrial fibrillation, trigeminal neuralgia.  He was found to have a right impacted femoral neck fracture.  He is here for orthopedic consultation and possible surgery.      No new subjective & objective note has been filed under this hospital service since the last note was generated.    Assessment/Plan:     * Closed fracture of right hip  Detailed options have been discussed with the family.  Given the fact he is still ambulatory and having pain.  And will offer him surgery of the right hip.  I recommend endoprosthesis versus percutaneous pinning because of his dementia.  They consented and understand the risk of surgery not excluding infection, bleeding, pain, scarring, pulmonary embolism, even death.  We will proceed with right hip endoprosthesis today.        Thank you for your consult.     Alex Sesay MD  Orthopedics  [facility]

## 2022-09-17 NOTE — ED PROVIDER NOTES
Encounter Date: 9/16/2022       History     Chief Complaint   Patient presents with    Fall     Pt fell earlier today, initially c/o knee pain, currently c/o right hip pain. No shortening or rotation noted.      89-year-old white gentleman brought in by his son after fall at home patient states he could not bear weight he was complaining of significant right hip pain so the son brought him to the emergency department.  Patient has past medical history of chronic renal insufficiency with a appears the baseline creatinine is around 1.5.  He also has a history of trigeminal neuralgia which he takes Trelegy lip tall and the son states he only takes it once a day and it controls his issues instead of how it is prescribed of twice a day    Review of patient's allergies indicates:  No Known Allergies  History reviewed. No pertinent past medical history.  History reviewed. No pertinent surgical history.  History reviewed. No pertinent family history.  Social History     Tobacco Use    Smoking status: Never    Smokeless tobacco: Never   Substance Use Topics    Alcohol use: Never    Drug use: Never     Review of Systems   Constitutional: Negative.  Negative for activity change, appetite change, chills, diaphoresis, fatigue, fever and unexpected weight change.   HENT: Negative.  Negative for congestion, dental problem, drooling, ear discharge, ear pain, facial swelling, hearing loss, mouth sores, nosebleeds, postnasal drip, rhinorrhea, sinus pressure, sinus pain, sneezing, sore throat, tinnitus, trouble swallowing and voice change.    Eyes: Negative.  Negative for photophobia, pain, discharge, redness, itching and visual disturbance.   Respiratory: Negative.  Negative for apnea, cough, choking, chest tightness, shortness of breath, wheezing and stridor.    Cardiovascular: Negative.  Negative for chest pain, palpitations and leg swelling.   Gastrointestinal: Negative.  Negative for abdominal distention, abdominal pain, anal  bleeding, blood in stool, constipation, diarrhea, nausea, rectal pain and vomiting.   Endocrine: Negative.  Negative for cold intolerance, heat intolerance, polydipsia, polyphagia and polyuria.   Genitourinary: Negative.  Negative for decreased urine volume, difficulty urinating, dysuria, enuresis, flank pain, frequency, genital sores, hematuria, penile discharge, penile pain, penile swelling, scrotal swelling, testicular pain and urgency.   Musculoskeletal:  Positive for arthralgias and gait problem. Negative for back pain, joint swelling, myalgias, neck pain and neck stiffness.   Skin: Negative.  Negative for color change, pallor, rash and wound.   Allergic/Immunologic: Negative.  Negative for environmental allergies, food allergies and immunocompromised state.   Neurological:  Negative for dizziness, tremors, seizures, syncope, facial asymmetry, speech difficulty, weakness, light-headedness, numbness and headaches.   Hematological: Negative.  Negative for adenopathy. Does not bruise/bleed easily.   Psychiatric/Behavioral: Negative.  Negative for agitation, behavioral problems, confusion, decreased concentration, dysphoric mood, hallucinations, self-injury, sleep disturbance and suicidal ideas. The patient is not nervous/anxious and is not hyperactive.    All other systems reviewed and are negative.    Physical Exam     Initial Vitals [09/16/22 2019]   BP Pulse Resp Temp SpO2   (!) 159/71 82 18 98.2 °F (36.8 °C) 97 %      MAP       --         Physical Exam    Nursing note and vitals reviewed.  Constitutional: He appears well-developed and well-nourished.   Appears to be in a significant amount of pain   HENT:   Head: Normocephalic and atraumatic.   Eyes: Conjunctivae and EOM are normal. Pupils are equal, round, and reactive to light.   Neck: Neck supple.   Normal range of motion.  Cardiovascular:  Normal rate and regular rhythm.           Pulmonary/Chest: Breath sounds normal.   Abdominal: Abdomen is soft. Bowel  sounds are normal.   Musculoskeletal:      Cervical back: Normal range of motion and neck supple.      Comments: Guarding his right hip and will not move it secondary to pain, he has good distal pulses in the right lower extremity     Neurological: He is alert and oriented to person, place, and time.   Skin: Skin is warm and dry. Capillary refill takes less than 2 seconds.   Psychiatric: He has a normal mood and affect. Thought content normal.       ED Course   Procedures  Admission on 09/16/2022   Component Date Value Ref Range Status    Sodium Level 09/16/2022 144  132 - 146 mmol/L Final    Potassium Level 09/16/2022 4.5  3.5 - 5.1 mmol/L Final    Chloride 09/16/2022 107  98 - 111 mmol/L Final    Carbon Dioxide 09/16/2022 27  23 - 31 mmol/L Final    Glucose Level 09/16/2022 131 (H)  75 - 121 mg/dL Final    Blood Urea Nitrogen 09/16/2022 32.0 (H)  8.4 - 25.7 mg/dL Final    Creatinine 09/16/2022 1.49 (H)  0.73 - 1.18 mg/dL Final    BUN/Creatinine Ratio 09/16/2022 21   Final    Calcium Level Total 09/16/2022 9.1  8.8 - 10.0 mg/dL Final    Anion Gap 09/16/2022 10.0  mEq/L Final    eGFR 09/16/2022 44  mls/min/1.73/m2 Final    PT 09/16/2022 14.6 (H)  12.5 - 14.5 seconds Final    INR 09/16/2022 1.15  0.00 - 1.30 Final    WBC 09/16/2022 10.3  4.5 - 11.5 x10(3)/mcL Final    RBC 09/16/2022 4.65 (L)  4.70 - 6.10 x10(6)/mcL Final    Hgb 09/16/2022 15.2  14.0 - 18.0 gm/dL Final    Hct 09/16/2022 46.7  42.0 - 52.0 % Final    MCV 09/16/2022 100.4 (H)  80.0 - 94.0 fL Final    MCH 09/16/2022 32.7 (H)  27.0 - 31.0 pg Final    MCHC 09/16/2022 32.5 (L)  33.0 - 36.0 mg/dL Final    RDW 09/16/2022 14.2  11.5 - 17.0 % Final    Platelet 09/16/2022 118 (L)  130 - 400 x10(3)/mcL Final    MPV 09/16/2022 12.5 (H)  7.4 - 10.4 fL Final    Neut % 09/16/2022 62.0  % Final    Lymph % 09/16/2022 19.1  % Final    Mono % 09/16/2022 7.7  % Final    Eos % 09/16/2022 9.3  % Final    Basophil % 09/16/2022 0.7  % Final    Lymph # 09/16/2022 1.97  0.6 -  4.6 x10(3)/mcL Final    Neut # 09/16/2022 6.4  2.1 - 9.2 x10(3)/mcL Final    Mono # 09/16/2022 0.79  0.1 - 1.3 x10(3)/mcL Final    Eos # 09/16/2022 0.96 (H)  0 - 0.9 x10(3)/mcL Final    Baso # 09/16/2022 0.07  0 - 0.2 x10(3)/mcL Final    IG# 09/16/2022 0.12 (H)  0 - 0.04 x10(3)/mcL Final    IG% 09/16/2022 1.2  % Final       Labs Reviewed   BASIC METABOLIC PANEL - Abnormal; Notable for the following components:       Result Value    Glucose Level 131 (*)     Blood Urea Nitrogen 32.0 (*)     Creatinine 1.49 (*)     All other components within normal limits   PROTIME-INR - Abnormal; Notable for the following components:    PT 14.6 (*)     All other components within normal limits   CBC WITH DIFFERENTIAL - Abnormal; Notable for the following components:    RBC 4.65 (*)     .4 (*)     MCH 32.7 (*)     MCHC 32.5 (*)     Platelet 118 (*)     MPV 12.5 (*)     Eos # 0.96 (*)     IG# 0.12 (*)     All other components within normal limits   CBC W/ AUTO DIFFERENTIAL    Narrative:     The following orders were created for panel order CBC auto differential.  Procedure                               Abnormality         Status                     ---------                               -----------         ------                     CBC with Differential[[id number]]        Abnormal            Final result                 Please view results for these tests on the individual orders.          Imaging Results              X-Ray Hip 2 or 3 views Right (with Pelvis when performed) (Preliminary result)  Result time 09/16/22 21:31:17      ED Interpretation by Alvin Falk MD (09/16/22 21:31:17, [facility] - Emergency Dept, Emergency Medicine)    Impacted intratrochanteric  fracture                                     X-Ray Pelvis Routine AP (Preliminary result)  Result time 09/16/22 21:31:30      ED Interpretation by Alvin Falk MD (09/16/22 21:31:30, [facility] - Emergency Dept, Emergency  Medicine)    Inter trochanteric fracture of the right with shortening as compared to the left                                     Medications   0.9%  NaCl infusion (1,000 mLs Intravenous New Bag 9/16/22 2037)   ondansetron injection 4 mg (4 mg Intravenous Given 9/16/22 2037)   HYDROmorphone (PF) injection 1 mg (1 mg Intravenous Given 9/16/22 2047)   lorazepam injection 1 mg (1 mg Intravenous Given 9/16/22 2120)                 ED Course as of 09/16/22 2133   Fri Sep 16, 2022   2110 Spoke with Dr. Sesay who requests that patient be admitted to the primary and he will take to surgery in the morning [PL]      ED Course User Index  [PL] Alvin Falk MD                 Clinical Impression:   Final diagnoses:  [W19.XXXA] Fall  [S72.009A] Hip fracture  [S72.144A] Closed nondisplaced intertrochanteric fracture of right femur, initial encounter (Primary)        ED Disposition Condition    Admit Stable                Alvin Falk MD  09/16/22 2133

## 2022-09-17 NOTE — ANESTHESIA PROCEDURE NOTES
Intubation    Date/Time: 9/17/2022 11:18 AM  Performed by: Obdulio Roger CRNA  Authorized by: Jon Reveles DO     Intubation:     Induction:  Intravenous    Mask Ventilation:  N/a    Attempts:  1    Attempted By:  CRNA    Difficult Airway Encountered?: No      Complications:  None    Airway Device:  Supraglottic airway/LMA (IGEL)    Airway Device Size:  3.0    Style/Cuff Inflation:  Uncuffed    Placement Verified By:  Capnometry    Complicating Factors:  None    Findings Post-Intubation:  BS equal bilateral and atraumatic/condition of teeth unchanged

## 2022-09-17 NOTE — HPI
89-year-old male was brought to the emergency room last night after a fall at home.  His son found him after a fall.  He is normally ambulatory but with his dementia is not always use his walker.  He was admitted by the hospitalist.  Past medical history is pertinent for dementia, atrial fibrillation, trigeminal neuralgia.  He was found to have a right impacted femoral neck fracture.  He is here for orthopedic consultation and possible surgery.

## 2022-09-17 NOTE — SUBJECTIVE & OBJECTIVE
"Past Medical History:   Diagnosis Date    Trigeminal neuralgia        History reviewed. No pertinent surgical history.    Review of patient's allergies indicates:  No Known Allergies    Current Facility-Administered Medications   Medication    0.9%  NaCl infusion    enoxaparin injection 30 mg    famotidine (PF) injection 20 mg    HYDROmorphone (PF) injection 1 mg    melatonin tablet 6 mg    ondansetron injection 4 mg    OXcarbazepine tablet 150 mg    oxyCODONE immediate release tablet 5 mg    prochlorperazine injection Soln 5 mg    senna-docusate 8.6-50 mg per tablet 1 tablet    sodium chloride 0.9% flush 10 mL     Family History    None       Tobacco Use    Smoking status: Never    Smokeless tobacco: Never   Substance and Sexual Activity    Alcohol use: Never    Drug use: Never    Sexual activity: Not on file     Review of Systems   Unable to perform ROS: Dementia   Objective:     Vital Signs (Most Recent):  Temp: 99.2 °F (37.3 °C) (09/17/22 0715)  Pulse: 87 (09/17/22 0715)  Resp: 20 (09/17/22 0500)  BP: 120/73 (09/17/22 0715)  SpO2: 99 % (09/17/22 0800) Vital Signs (24h Range):  Temp:  [96.8 °F (36 °C)-99.2 °F (37.3 °C)] 99.2 °F (37.3 °C)  Pulse:  [] 87  Resp:  [14-20] 20  SpO2:  [87 %-100 %] 99 %  BP: (120-165)/() 120/73     Weight: 67.6 kg (149 lb)  Height: 5' 10" (177.8 cm)  Body mass index is 21.38 kg/m².      Intake/Output Summary (Last 24 hours) at 9/17/2022 0901  Last data filed at 9/17/2022 0600  Gross per 24 hour   Intake --   Output 800 ml   Net -800 ml       General    Vitals reviewed.  HENT:   Head: Normocephalic.   Cardiovascular:  Normal rate and regular rhythm.            Pulmonary/Chest: Breath sounds normal. No respiratory distress. He exhibits no tenderness.   Abdominal: Soft.   Neurological:   Neurologically the patient is sedated and is not verbal at this time.  Normally he is able to communicate verbally according to the daughter who was present during the examination and interview " process.  I am not able to really evaluate his neurologic status.  The daughter states that he does have sundowning and at times is not very verbal.  He does not have any obvious signs of hemiplegia.  He has no facial droop.             Right Hip Exam     Tenderness   The patient tender to palpation of the trochanteric bursa.    Comments:  He is in the fetal position but I am able to straighten his legs out.  He has observed pain with range of motion of the right hip.  He does not appear to have any pain with range of motion of the left hip.  Neurologic exam is not obtainable any not able to sit up at bedside.      Vascular Exam     Right Pulses  Dorsalis Pedis:      1+          Significant Labs: BMP:   Recent Labs   Lab 09/16/22 2035      K 4.5   CO2 27   BUN 32.0*   CREATININE 1.49*   CALCIUM 9.1     CBC:   Recent Labs   Lab 09/16/22 2035   WBC 10.3   HGB 15.2   HCT 46.7   *     All pertinent labs within the past 24 hours have been reviewed.    Significant Imaging: X-Ray: I have reviewed all pertinent results/findings and my personal findings are:  X-rays were reviewed.  He has a right impacted femoral neck fracture

## 2022-09-17 NOTE — HPI
89-year-old male with hx Dementia,  HTN, Trigeminal neuralgia, Afib, CKD stage 3 baseline Cr 1.5, brought in by his son after fall at home patient states he could not bear weight he was complaining of significant right hip pain so the son brought him to the emergency department.  No LOC, no chest pain, no F/C, no chest pain per daughter. PT takesTrileptal 150 mg daily per daughter. Pt has developed rash for past 5 days per daughter who stays with him. Pt was scratching and agitated he was give Ativan in Ed, now sleeping. Daughter at bedside. Pt is DNR per daughter

## 2022-09-17 NOTE — OP NOTE
OPERATIVE REPORT      Date: 9/17/2022     Preop Diagnosis:  right  femoral neck hip fracture    Postop Diagnosis:  Same    Procedure:  right  hip unipolar arthroplasty    Implant type:  Treasure porous-coated stem size 6 standard, unipolar head 51  mm diameter    Surgeon: Alex Sesay MD    Assistant: Kourtney Oliver    Blood loss:  100 cc    Complications:  None    Procedure in detail:  Informed consent was obtained.  Risks of the procedure was explained in detail with the patient and family not excluding infection, bleeding, pain, scarring, neurovascular injury, leg-length discrepancy, dislocation, periprosthetic fracture, need for revision surgery, DVT, pulmonary embolism, even death.  She was brought to the OR and given IV antibiotics and tranexamic acid.  She was given a spinal anesthetic.  She was prepped and draped in lateral decubitus position exposing the right hip.  A lateral incision was made with the ITB band was divided.  A Charnley retractor was placed exposing the abductor.  The anterior 2/3 of the gluteus medius was taken down with the Bovie and preserved for later reattachment.  The gluteus minimus was also taken down and preserved for later reattachment.  The capsule was opened anteriorly with a scalpel and hemarthrosis was identified.  Femoral neck fracture was exposed with the leg at the side of the table.  Using Hohmann's protecting soft tissue, an oscillating saw was used to shorten the femoral neck.  The acetabulum was exposed after removing the femoral head.  Femoral head was sized for a 51 .  The acetabulum was free of any significant arthritic disease.  Next the femoral canal was prepared with a cookie cutter, reaming, and broaching up to a size 6 with good fill and fit.  The hip was taken through a trial range of motion.  I selected a -3 tapered spacer.  This gave her equal leg lengths.  The final implant was carefully tapped into position with good fill and fit.  The calcar was checked  and I saw no evidence of fracture.  The hip was stable leg length was checked again and confirmed to be satisfactory.  The wound was Pulsavac lavaged and dried.  I injected the surgical field with Marcaine.  The capsule was closed with interrupted 1. Vicryl.  The minimus was reattached with 1. Vicryl.  The medius was meticulously repaired with interrupted 1. Vicryl.  The ITB band was closed with a running 1. Vicryl.  The subQ was closed with a running 2 0.  This skin was stapled sterile dressing applied.  There were no complications.        Alex Sesay MD  Department of Orthopedic Surgery  [facility]  [address] [place] [address]  Phone: [phone]

## 2022-09-17 NOTE — ANESTHESIA POSTPROCEDURE EVALUATION
Anesthesia Post Evaluation    Patient: Blaze Turner    Procedure(s) Performed: Procedure(s) (LRB):  ENDOPROSTHESIS (Right)    Final Anesthesia Type: general      Patient location during evaluation: PACU  Patient participation: Yes- Able to Participate  Level of consciousness: awake and alert  Post-procedure vital signs: reviewed and stable  Pain management: adequate  Airway patency: patent  BETH mitigation strategies: Multimodal analgesia  PONV status at discharge: No PONV  Anesthetic complications: no      Cardiovascular status: blood pressure returned to baseline  Respiratory status: unassisted  Hydration status: euvolemic  Follow-up not needed.          Vitals Value Taken Time   /73 09/17/22 0715   Temp 37.3 °C (99.2 °F) 09/17/22 0715   Pulse 87 09/17/22 0715   Resp 20 09/17/22 0500   SpO2 99 % 09/17/22 0800         No case tracking events are documented in the log.      Pain/Frances Score: Pain Rating Prior to Med Admin: 8 (9/16/2022  8:47 PM)  Pain Rating Post Med Admin: 4 (9/16/2022  9:29 PM)

## 2022-09-17 NOTE — CONSULTS
[facility] - Medical Surgical Unit  Orthopedics  Consult Note    Patient Name: Blaze Turner  MRN: [id number]  Admission Date: 9/16/2022  Hospital Length of Stay: 1 days  Attending Provider: Gus Hewitt MD  Primary Care Provider: Gaudencio Munguia MD    Patient information was obtained from relative(s) and ER records.     Consults  Subjective:     Principal Problem:Closed fracture of right hip    Chief Complaint:   Chief Complaint   Patient presents with    Fall     Pt fell earlier today, initially c/o knee pain, currently c/o right hip pain. No shortening or rotation noted.         HPI: 89-year-old male was brought to the emergency room last night after a fall at home.  His son found him after a fall.  He is normally ambulatory but with his dementia is not always use his walker.  He was admitted by the hospitalist.  Past medical history is pertinent for dementia, atrial fibrillation, trigeminal neuralgia.  He was found to have a right impacted femoral neck fracture.  He is here for orthopedic consultation and possible surgery.      Past Medical History:   Diagnosis Date    Trigeminal neuralgia        History reviewed. No pertinent surgical history.    Review of patient's allergies indicates:  No Known Allergies    Current Facility-Administered Medications   Medication    0.9%  NaCl infusion    enoxaparin injection 30 mg    famotidine (PF) injection 20 mg    HYDROmorphone (PF) injection 1 mg    melatonin tablet 6 mg    ondansetron injection 4 mg    OXcarbazepine tablet 150 mg    oxyCODONE immediate release tablet 5 mg    prochlorperazine injection Soln 5 mg    senna-docusate 8.6-50 mg per tablet 1 tablet    sodium chloride 0.9% flush 10 mL     Family History    None       Tobacco Use    Smoking status: Never    Smokeless tobacco: Never   Substance and Sexual Activity    Alcohol use: Never    Drug use: Never    Sexual activity: Not on file     Review of Systems   Unable to perform  "ROS: Dementia   Objective:     Vital Signs (Most Recent):  Temp: 99.2 °F (37.3 °C) (09/17/22 0715)  Pulse: 87 (09/17/22 0715)  Resp: 20 (09/17/22 0500)  BP: 120/73 (09/17/22 0715)  SpO2: 99 % (09/17/22 0800) Vital Signs (24h Range):  Temp:  [96.8 °F (36 °C)-99.2 °F (37.3 °C)] 99.2 °F (37.3 °C)  Pulse:  [] 87  Resp:  [14-20] 20  SpO2:  [87 %-100 %] 99 %  BP: (120-165)/() 120/73     Weight: 67.6 kg (149 lb)  Height: 5' 10" (177.8 cm)  Body mass index is 21.38 kg/m².      Intake/Output Summary (Last 24 hours) at 9/17/2022 0901  Last data filed at 9/17/2022 0600  Gross per 24 hour   Intake --   Output 800 ml   Net -800 ml       General    Vitals reviewed.  HENT:   Head: Normocephalic.   Cardiovascular:  Normal rate and regular rhythm.            Pulmonary/Chest: Breath sounds normal. No respiratory distress. He exhibits no tenderness.   Abdominal: Soft.   Neurological:   Neurologically the patient is sedated and is not verbal at this time.  Normally he is able to communicate verbally according to the daughter who was present during the examination and interview process.  I am not able to really evaluate his neurologic status.  The daughter states that he does have sundowning and at times is not very verbal.  He does not have any obvious signs of hemiplegia.  He has no facial droop.             Right Hip Exam     Tenderness   The patient tender to palpation of the trochanteric bursa.    Comments:  He is in the fetal position but I am able to straighten his legs out.  He has observed pain with range of motion of the right hip.  He does not appear to have any pain with range of motion of the left hip.  Neurologic exam is not obtainable any not able to sit up at bedside.      Vascular Exam     Right Pulses  Dorsalis Pedis:      1+          Significant Labs: BMP:   Recent Labs   Lab 09/16/22 2035      K 4.5   CO2 27   BUN 32.0*   CREATININE 1.49*   CALCIUM 9.1     CBC:   Recent Labs   Lab 09/16/22 2035 "   WBC 10.3   HGB 15.2   HCT 46.7   *     All pertinent labs within the past 24 hours have been reviewed.    Significant Imaging: X-Ray: I have reviewed all pertinent results/findings and my personal findings are:  X-rays were reviewed.  He has a right impacted femoral neck fracture    Assessment/Plan:     No notes have been filed under this hospital service.  Service: Orthopedic Surgery      Thank you for your consult.     Alex Sesay MD  Orthopedics  [facility] - [facility] Surgical [facility]

## 2022-09-18 PROBLEM — F03.90 DEMENTIA: Chronic | Status: ACTIVE | Noted: 2022-09-18

## 2022-09-18 PROBLEM — G93.41 ENCEPHALOPATHY, METABOLIC: Status: ACTIVE | Noted: 2022-09-18

## 2022-09-18 PROCEDURE — 25000003 PHARM REV CODE 250: Performed by: INTERNAL MEDICINE

## 2022-09-18 PROCEDURE — 63600175 PHARM REV CODE 636 W HCPCS: Performed by: SPECIALIST

## 2022-09-18 PROCEDURE — 83735 ASSAY OF MAGNESIUM: CPT | Performed by: INTERNAL MEDICINE

## 2022-09-18 PROCEDURE — 51701 INSERT BLADDER CATHETER: CPT

## 2022-09-18 PROCEDURE — 27000221 HC OXYGEN, UP TO 24 HOURS

## 2022-09-18 PROCEDURE — 85025 COMPLETE CBC W/AUTO DIFF WBC: CPT | Performed by: INTERNAL MEDICINE

## 2022-09-18 PROCEDURE — 99900035 HC TECH TIME PER 15 MIN (STAT)

## 2022-09-18 PROCEDURE — 63600175 PHARM REV CODE 636 W HCPCS: Performed by: INTERNAL MEDICINE

## 2022-09-18 PROCEDURE — 80069 RENAL FUNCTION PANEL: CPT | Performed by: INTERNAL MEDICINE

## 2022-09-18 PROCEDURE — 36415 COLL VENOUS BLD VENIPUNCTURE: CPT | Performed by: INTERNAL MEDICINE

## 2022-09-18 PROCEDURE — 11000001 HC ACUTE MED/SURG PRIVATE ROOM

## 2022-09-18 PROCEDURE — 94761 N-INVAS EAR/PLS OXIMETRY MLT: CPT

## 2022-09-18 PROCEDURE — 25000003 PHARM REV CODE 250: Performed by: SPECIALIST

## 2022-09-18 RX ADMIN — MUPIROCIN: 20 OINTMENT TOPICAL at 09:09

## 2022-09-18 RX ADMIN — ACETAMINOPHEN 325 MG: 325 SUPPOSITORY RECTAL at 12:09

## 2022-09-18 RX ADMIN — SODIUM CHLORIDE, POTASSIUM CHLORIDE, SODIUM LACTATE AND CALCIUM CHLORIDE: 600; 310; 30; 20 INJECTION, SOLUTION INTRAVENOUS at 10:09

## 2022-09-18 RX ADMIN — MUPIROCIN: 20 OINTMENT TOPICAL at 12:09

## 2022-09-18 RX ADMIN — ACETAMINOPHEN 325 MG: 325 SUPPOSITORY RECTAL at 06:09

## 2022-09-18 RX ADMIN — ENOXAPARIN SODIUM 30 MG: 30 INJECTION SUBCUTANEOUS at 04:09

## 2022-09-18 RX ADMIN — ACETAMINOPHEN 325MG 650 MG: 325 TABLET ORAL at 10:09

## 2022-09-18 RX ADMIN — SENNOSIDES AND DOCUSATE SODIUM 1 TABLET: 50; 8.6 TABLET ORAL at 10:09

## 2022-09-18 RX ADMIN — FAMOTIDINE 20 MG: 10 INJECTION, SOLUTION INTRAVENOUS at 09:09

## 2022-09-18 NOTE — PT/OT/SLP PROGRESS
Physical Therapy      Patient Name:  Blaze Turner   MRN:  [id number]    Patient not seen today secondary to Unarousable. PT attempted to see patient twice this morning and was unable to arouse patient. Will follow-up tomorrow.

## 2022-09-18 NOTE — PROGRESS NOTES
"[facility]  Orthopedics  Progress Note    Patient Name: Blaze Turner  MRN: [id number]  Admission Date: 9/16/2022  Hospital Length of Stay: 2 days  Attending Provider: Gus Hewitt MD  Primary Care Provider: Gaudencio Munguia MD  Follow-up For: Procedure(s) (LRB):  ENDOPROSTHESIS (Right)    Post-Operative Day: 1 Day Post-Op  Subjective:     Principal Problem:Closed fracture of right hip    Principal Orthopedic Problem: hip fracture    Interval History: pod 1    Review of patient's allergies indicates:  No Known Allergies    Current Facility-Administered Medications   Medication    acetaminophen tablet 650 mg    bisacodyL suppository 10 mg    enoxaparin injection 30 mg    famotidine (PF) injection 20 mg    HYDROcodone-acetaminophen  mg per tablet 1 tablet    HYDROcodone-acetaminophen 5-325 mg per tablet 1 tablet    lactated ringers infusion    loperamide capsule 4 mg    melatonin tablet 6 mg    mupirocin 2 % ointment    ondansetron injection 4 mg    ondansetron injection 4 mg    OXcarbazepine tablet 150 mg    oxyCODONE immediate release tablet 5 mg    polyethylene glycol packet 17 g    senna-docusate 8.6-50 mg per tablet 1 tablet    sodium chloride 0.9% flush 10 mL    sodium chloride 0.9% flush 3 mL     Objective:     Vital Signs (Most Recent):  Temp: 98.4 °F (36.9 °C) (09/18/22 0753)  Pulse: 87 (09/18/22 0753)  Resp: 18 (09/18/22 0753)  BP: (!) 108/56 (09/18/22 0753)  SpO2: (!) 91 % (09/18/22 0800)   Vital Signs (24h Range):  Temp:  [96.8 °F (36 °C)-98.6 °F (37 °C)] 98.4 °F (36.9 °C)  Pulse:  [66-95] 87  Resp:  [8-22] 18  SpO2:  [86 %-100 %] 91 %  BP: (102-152)/(56-78) 108/56     Weight: 67.6 kg (149 lb)  Height: 5' 10" (177.8 cm)  Body mass index is 21.38 kg/m².      Intake/Output Summary (Last 24 hours) at 9/18/2022 1027  Last data filed at 9/17/2022 1800  Gross per 24 hour   Intake 1450 ml   Output 100 ml   Net 1350 ml                   Right Hip " Exam     Comments:  All right hip dressing is intact.      Significant Labs: CBC:   Recent Labs   Lab 09/16/22 2035   WBC 10.3   HGB 15.2   HCT 46.7   *     All pertinent labs within the past 24 hours have been reviewed.    Significant Imaging: X-Ray: I have reviewed all pertinent results/findings and my personal findings are:  hip with TFN aligned    Assessment/Plan:     * Closed fracture of right hip  Detailed options have been discussed with the family.  Given the fact he is still ambulatory and having pain.  And will offer him surgery of the right hip.  I recommend endoprosthesis versus percutaneous pinning because of his dementia.  They consented and understand the risk of surgery not excluding infection, bleeding, pain, scarring, pulmonary embolism, even death.  We will proceed with right hip endoprosthesis today.    Continue postop care.  We will see how he does with oral intake.  Hopefully his cognition will improve to where he would be a candidate for inpatient acute rehab.          Alex Sesay MD  Orthopedics  [facility]

## 2022-09-18 NOTE — ASSESSMENT & PLAN NOTE
Detailed options have been discussed with the family.  Given the fact he is still ambulatory and having pain.  And will offer him surgery of the right hip.  I recommend endoprosthesis versus percutaneous pinning because of his dementia.  They consented and understand the risk of surgery not excluding infection, bleeding, pain, scarring, pulmonary embolism, even death.  We will proceed with right hip endoprosthesis today.    Continue postop care.  We will see how he does with oral intake.  Hopefully his cognition will improve to where he would be a candidate for inpatient acute rehab.

## 2022-09-18 NOTE — SUBJECTIVE & OBJECTIVE
"Principal Problem:Closed fracture of right hip    Principal Orthopedic Problem: hip fracture    Interval History: pod 1    Review of patient's allergies indicates:  No Known Allergies    Current Facility-Administered Medications   Medication    acetaminophen tablet 650 mg    bisacodyL suppository 10 mg    enoxaparin injection 30 mg    famotidine (PF) injection 20 mg    HYDROcodone-acetaminophen  mg per tablet 1 tablet    HYDROcodone-acetaminophen 5-325 mg per tablet 1 tablet    lactated ringers infusion    loperamide capsule 4 mg    melatonin tablet 6 mg    mupirocin 2 % ointment    ondansetron injection 4 mg    ondansetron injection 4 mg    OXcarbazepine tablet 150 mg    oxyCODONE immediate release tablet 5 mg    polyethylene glycol packet 17 g    senna-docusate 8.6-50 mg per tablet 1 tablet    sodium chloride 0.9% flush 10 mL    sodium chloride 0.9% flush 3 mL     Objective:     Vital Signs (Most Recent):  Temp: 98.4 °F (36.9 °C) (09/18/22 0753)  Pulse: 87 (09/18/22 0753)  Resp: 18 (09/18/22 0753)  BP: (!) 108/56 (09/18/22 0753)  SpO2: (!) 91 % (09/18/22 0800)   Vital Signs (24h Range):  Temp:  [96.8 °F (36 °C)-98.6 °F (37 °C)] 98.4 °F (36.9 °C)  Pulse:  [66-95] 87  Resp:  [8-22] 18  SpO2:  [86 %-100 %] 91 %  BP: (102-152)/(56-78) 108/56     Weight: 67.6 kg (149 lb)  Height: 5' 10" (177.8 cm)  Body mass index is 21.38 kg/m².      Intake/Output Summary (Last 24 hours) at 9/18/2022 1027  Last data filed at 9/17/2022 1800  Gross per 24 hour   Intake 1450 ml   Output 100 ml   Net 1350 ml                   Right Hip Exam     Comments:  All right hip dressing is intact.      Significant Labs: CBC:   Recent Labs   Lab 09/16/22 2035   WBC 10.3   HGB 15.2   HCT 46.7   *     All pertinent labs within the past 24 hours have been reviewed.    Significant Imaging: X-Ray: I have reviewed all pertinent results/findings and my personal findings are:  hip with TFN aligned  "

## 2022-09-19 LAB
ALBUMIN SERPL-MCNC: 2.5 GM/DL (ref 3.4–4.8)
ALBUMIN SERPL-MCNC: 2.6 GM/DL (ref 3.4–4.8)
BASOPHILS # BLD AUTO: 0.08 X10(3)/MCL (ref 0–0.2)
BASOPHILS # BLD AUTO: 0.14 X10(3)/MCL (ref 0–0.2)
BASOPHILS NFR BLD AUTO: 0.3 %
BASOPHILS NFR BLD AUTO: 0.6 %
BUN SERPL-MCNC: 17 MG/DL (ref 8.4–25.7)
BUN SERPL-MCNC: 18 MG/DL (ref 8.4–25.7)
CALCIUM SERPL-MCNC: 8.2 MG/DL (ref 8.8–10)
CALCIUM SERPL-MCNC: 8.4 MG/DL (ref 8.8–10)
CHLORIDE SERPL-SCNC: 106 MMOL/L (ref 98–111)
CHLORIDE SERPL-SCNC: 107 MMOL/L (ref 98–111)
CO2 SERPL-SCNC: 27 MMOL/L (ref 23–31)
CO2 SERPL-SCNC: 29 MMOL/L (ref 23–31)
CREAT SERPL-MCNC: 0.97 MG/DL (ref 0.73–1.18)
CREAT SERPL-MCNC: 1.07 MG/DL (ref 0.73–1.18)
EOSINOPHIL # BLD AUTO: 0.19 X10(3)/MCL (ref 0–0.9)
EOSINOPHIL # BLD AUTO: 0.83 X10(3)/MCL (ref 0–0.9)
EOSINOPHIL NFR BLD AUTO: 0.8 %
EOSINOPHIL NFR BLD AUTO: 3.8 %
ERYTHROCYTE [DISTWIDTH] IN BLOOD BY AUTOMATED COUNT: 14.3 % (ref 11.5–17)
ERYTHROCYTE [DISTWIDTH] IN BLOOD BY AUTOMATED COUNT: 14.4 % (ref 11.5–17)
GFR SERPLBLD CREATININE-BSD FMLA CKD-EPI: >60 MLS/MIN/1.73/M2
GFR SERPLBLD CREATININE-BSD FMLA CKD-EPI: >60 MLS/MIN/1.73/M2
GLUCOSE SERPL-MCNC: 134 MG/DL (ref 75–121)
GLUCOSE SERPL-MCNC: 88 MG/DL (ref 75–121)
HCT VFR BLD AUTO: 33.2 % (ref 42–52)
HCT VFR BLD AUTO: 33.9 % (ref 42–52)
HGB BLD-MCNC: 10.7 GM/DL (ref 14–18)
HGB BLD-MCNC: 10.9 GM/DL (ref 14–18)
IMM GRANULOCYTES # BLD AUTO: 0.23 X10(3)/MCL (ref 0–0.04)
IMM GRANULOCYTES # BLD AUTO: 0.23 X10(3)/MCL (ref 0–0.04)
IMM GRANULOCYTES NFR BLD AUTO: 1 %
IMM GRANULOCYTES NFR BLD AUTO: 1 %
LYMPHOCYTES # BLD AUTO: 1.58 X10(3)/MCL (ref 0.6–4.6)
LYMPHOCYTES # BLD AUTO: 1.62 X10(3)/MCL (ref 0.6–4.6)
LYMPHOCYTES NFR BLD AUTO: 6.7 %
LYMPHOCYTES NFR BLD AUTO: 7.1 %
MAGNESIUM SERPL-MCNC: 1.8 MG/DL (ref 1.6–2.6)
MAGNESIUM SERPL-MCNC: 2 MG/DL (ref 1.6–2.6)
MCH RBC QN AUTO: 32.8 PG (ref 27–31)
MCH RBC QN AUTO: 33.4 PG (ref 27–31)
MCHC RBC AUTO-ENTMCNC: 31.6 MG/DL (ref 33–36)
MCHC RBC AUTO-ENTMCNC: 32.8 MG/DL (ref 33–36)
MCV RBC AUTO: 101.8 FL (ref 80–94)
MCV RBC AUTO: 104 FL (ref 80–94)
MONOCYTES # BLD AUTO: 1.97 X10(3)/MCL (ref 0.1–1.3)
MONOCYTES # BLD AUTO: 1.98 X10(3)/MCL (ref 0.1–1.3)
MONOCYTES NFR BLD AUTO: 8.2 %
MONOCYTES NFR BLD AUTO: 9 %
NEUTROPHILS # BLD AUTO: 17.4 X10(3)/MCL (ref 2.1–9.2)
NEUTROPHILS # BLD AUTO: 20 X10(3)/MCL (ref 2.1–9.2)
NEUTROPHILS NFR BLD AUTO: 78.5 %
NEUTROPHILS NFR BLD AUTO: 83 %
PHOSPHATE SERPL-MCNC: 1.5 MG/DL (ref 2.3–4.7)
PHOSPHATE SERPL-MCNC: 1.6 MG/DL (ref 2.3–4.7)
PLATELET # BLD AUTO: 90 X10(3)/MCL (ref 130–400)
PLATELET # BLD AUTO: 94 X10(3)/MCL (ref 130–400)
PMV BLD AUTO: 12.5 FL (ref 7.4–10.4)
PMV BLD AUTO: 13.1 FL (ref 7.4–10.4)
POTASSIUM SERPL-SCNC: 4.3 MMOL/L (ref 3.5–5.1)
POTASSIUM SERPL-SCNC: 4.4 MMOL/L (ref 3.5–5.1)
RBC # BLD AUTO: 3.26 X10(6)/MCL (ref 4.7–6.1)
RBC # BLD AUTO: 3.26 X10(6)/MCL (ref 4.7–6.1)
SODIUM SERPL-SCNC: 139 MMOL/L (ref 132–146)
SODIUM SERPL-SCNC: 141 MMOL/L (ref 132–146)
WBC # SPEC AUTO: 22.1 X10(3)/MCL (ref 4.5–11.5)
WBC # SPEC AUTO: 24 X10(3)/MCL (ref 4.5–11.5)

## 2022-09-19 PROCEDURE — 31720 CLEARANCE OF AIRWAYS: CPT

## 2022-09-19 PROCEDURE — 94761 N-INVAS EAR/PLS OXIMETRY MLT: CPT

## 2022-09-19 PROCEDURE — 80069 RENAL FUNCTION PANEL: CPT | Performed by: INTERNAL MEDICINE

## 2022-09-19 PROCEDURE — 63600175 PHARM REV CODE 636 W HCPCS: Performed by: INTERNAL MEDICINE

## 2022-09-19 PROCEDURE — 83735 ASSAY OF MAGNESIUM: CPT | Performed by: INTERNAL MEDICINE

## 2022-09-19 PROCEDURE — 25000003 PHARM REV CODE 250: Performed by: INTERNAL MEDICINE

## 2022-09-19 PROCEDURE — 97162 PT EVAL MOD COMPLEX 30 MIN: CPT

## 2022-09-19 PROCEDURE — 25000003 PHARM REV CODE 250: Performed by: SPECIALIST

## 2022-09-19 PROCEDURE — 99900035 HC TECH TIME PER 15 MIN (STAT)

## 2022-09-19 PROCEDURE — 85025 COMPLETE CBC W/AUTO DIFF WBC: CPT | Performed by: INTERNAL MEDICINE

## 2022-09-19 PROCEDURE — 97530 THERAPEUTIC ACTIVITIES: CPT

## 2022-09-19 PROCEDURE — 11000001 HC ACUTE MED/SURG PRIVATE ROOM

## 2022-09-19 PROCEDURE — 36415 COLL VENOUS BLD VENIPUNCTURE: CPT | Performed by: INTERNAL MEDICINE

## 2022-09-19 RX ADMIN — HYDROCODONE BITARTRATE AND ACETAMINOPHEN 1 TABLET: 10; 325 TABLET ORAL at 01:09

## 2022-09-19 RX ADMIN — ENOXAPARIN SODIUM 30 MG: 30 INJECTION SUBCUTANEOUS at 04:09

## 2022-09-19 RX ADMIN — MUPIROCIN: 20 OINTMENT TOPICAL at 08:09

## 2022-09-19 RX ADMIN — FAMOTIDINE 20 MG: 10 INJECTION, SOLUTION INTRAVENOUS at 08:09

## 2022-09-19 NOTE — PT/OT/SLP EVAL
Physical Therapy Evaluation    Patient Name:  Blaze Turner   MRN:  [id number]    Recommendations:     Discharge Recommendations:  nursing facility, skilled   Discharge Equipment Recommendations:     Barriers to discharge: None    Assessment:     Blaze Turner is a 89 y.o. male admitted with a medical diagnosis of Closed fracture of right hip.  He presents with the following impairments/functional limitations:  weakness, impaired endurance, impaired functional mobility, impaired self care skills, gait instability, impaired balance, impaired cognition, decreased lower extremity function, decreased safety awareness, pain, decreased ROM.    Pt found in bed asleep. History taken from son in law. He is from home where he lives with his wife. His daughter and son in law live with them as well and provide full time care. Pt was walking at home with assistance from family. A family member would hold him under the arms while he walked due to patient refusing to walk with a walker. His speech is slurred and difficult to understand which is his baseline. Today, he required total assist for all mobility tasks. He was unable to follow commands and confused. He required Max A for bed to chair transfer and had a tendency to flex his trunk forward once sitting in the chair. Therapist provided max verbal and tactile cues to sit back in chair for safety. At this time, he would benefit most from further physical therapy in a skilled nursing home to help return to PLOF.    Rehab Prognosis: Good and Fair; patient would benefit from acute skilled PT services to address these deficits and reach maximum level of function.    Recent Surgery: Procedure(s) (LRB):  ENDOPROSTHESIS (Right) 2 Days Post-Op    Plan:     During this hospitalization, patient to be seen BID to address the identified rehab impairments via gait training, therapeutic activities, therapeutic exercises and progress toward the following goals:    Plan of Care  "Expires:  10/17/22    Subjective     Chief Complaint: pain/confused  Patient/Family Comments/goals: To receive more therapy to return to PLOF  Pain/Comfort:  Pain Rating 1:  (patient grimmaced and said "ow" when moving, unable to rate)  Pain Addressed 1: Nurse notified, Cessation of Activity    Patients cultural, spiritual, Christianity conflicts given the current situation:      Living Environment:  Pt lives with wife, daughter and son in law  Prior to admission, patients level of function was Min A.  Equipment used at home: walker, rolling.      Objective:     Communicated with patients family prior to session.  Patient found HOB elevated with    upon PT entry to room.    General Precautions: Standard, fall   Orthopedic Precautions:RLE partial weight bearing   Braces:    Respiratory Status: Room air    Exams:  RLE ROM: Deficits: limited hip flexion  RLE Strength: Deficits: 2+/5  LLE ROM: WFL  LLE Strength: Deficits: 3/5    Functional Mobility:  Bed Mobility:     Supine to Sit: total assistance  Transfers:     Bed to Chair: maximal assistance with  hand-held assist  using  Squat Pivot  Balance: CGA-Max A to maintain upright sitting EOB    Therapeutic Activities and Exercises:   See above  Encouraged heel slides while in chair    AM-PAC 6 CLICK MOBILITY  Total Score:      Patient left up in chair with all lines intact, call button in reach, and family present.    GOALS:   Multidisciplinary Problems       Physical Therapy Goals          Problem: Physical Therapy    Goal Priority Disciplines Outcome Goal Variances Interventions   Physical Therapy Goal     PT, PT/OT Ongoing, Progressing     Description: Goals to be met by: 10/17/22     Patient will increase functional independence with mobility by performin. Supine to sit with MInimal Assistance  2. Sit to stand transfer with Minimal Assistance  3. Gait  x 150 feet with Contact Guard Assistance using Rolling Walker.                          History:     Past " Medical History:   Diagnosis Date    Trigeminal neuralgia        History reviewed. No pertinent surgical history.    Time Tracking:     PT Received On: 09/19/22  PT Start Time: 0800     PT Stop Time: 0840  PT Total Time (min): 40 min     Billable Minutes: Evaluation 40      09/19/2022

## 2022-09-19 NOTE — PROGRESS NOTES
[facility] Medicine Progress Note    Patient Name: Blaze Turner  Age: 89 y.o.   Code Status: Full Code  MRN: [id number]  Admission Date: 9/16/2022  8:15 PM   Patient Class: IP- Inpatient  Hospital Length of Stay: 2 days  Attending Provider: Gus Hewitt MD  Primary Care Provider: Gaudencio Munguia MD   Chief Complaint:   Chief Complaint   Patient presents with    Fall     Pt fell earlier today, initially c/o knee pain, currently c/o right hip pain. No shortening or rotation noted.     Patient denies any complaints of pain at this time.     Patient is postop day 1 TFN left hip fracture.           SUBJECTIVE:     Follow-up:  Closed fracture of right hip    HPI:  89-year-old male with hx Dementia,  HTN, Trigeminal neuralgia, Afib, CKD stage 3 baseline Cr 1.5, brought in by his son after fall at home patient states he could not bear weight he was complaining of significant right hip pain so the son brought him to the emergency department.  No LOC, no chest pain, no F/C, no chest pain per daughter. PT takesTrileptal 150 mg daily per daughter. Pt has developed rash for past 5 days per daughter who stays with him. Pt was scratching and agitated he was give Ativan in Ed, now sleeping. Daughter at bedside. Pt is DNR per daughter       Hospital Coarse:    9/18/22  Pt has remained lethargic and unarousable since surgery yesterday. Seem to attempt to wake up and mumbles with rough stimulation. Labs were ordered but not drawn this morning. Labs reordered and currently pending.         Scheduled Meds:   enoxaparin  30 mg Subcutaneous Daily    famotidine (PF)  20 mg Intravenous Daily    loperamide  4 mg Oral Once    mupirocin   Nasal BID    OXcarbazepine  150 mg Oral Daily    polyethylene glycol  17 g Oral Daily    senna-docusate 8.6-50 mg  1 tablet Oral BID     Continuous Infusions:   lactated ringers 100 mL/hr at 09/17/22 1518     PRN Meds:   acetaminophen    acetaminophen    bisacodyL     HYDROcodone-acetaminophen    HYDROcodone-acetaminophen    melatonin    ondansetron    ondansetron    oxyCODONE    sodium chloride 0.9%    sodium chloride 0.9%      Lines/Drains:   Lines/Drains/Airways       Drain  Duration                  Urethral Catheter 09/16/22 2120 Non-latex 16 Fr. 1 day              Epidural Line  Duration                  Neuraxial Analgesia/Anesthesia Assessment (using dermatomes) Epidural 09/17/22 1049 1 day         Neuraxial Analgesia/Anesthesia Assessment (using dermatomes) Epidural 09/17/22 1049 1 day                      Allergies as of 09/16/2022    (No Known Allergies)         Review of Systems: 10 systems reviewed all pertinent positives and negatives stated in HPI, otherwise negative.       OBJECTIVE:     Vital Signs (Most Recent)  Temp: 98.8 °F (37.1 °C) (09/18/22 2022)  Pulse: 91 (09/18/22 2022)  Resp: 18 (09/18/22 2001)  BP: (!) 96/57 (09/18/22 2022)  SpO2: 98 % (09/18/22 2022)    Vital Signs Range (Last 24H):  Temp:  [97.3 °F (36.3 °C)-100.5 °F (38.1 °C)]   Pulse:  []   Resp:  [18-22]   BP: ()/(56-70)   SpO2:  [91 %-99 %]     I & O (Last 24H):  Intake/Output Summary (Last 24 hours) at 9/18/2022 2022  Last data filed at 9/18/2022 1800  Gross per 24 hour   Intake --   Output 850 ml   Net -850 ml       Physical Exam  Constitutional:       General: He is sleeping.      Appearance: He is well-developed. He is not ill-appearing.   HENT:      Head: Normocephalic and atraumatic.      Nose: Nose normal.      Mouth/Throat:      Mouth: Mucous membranes are moist.      Pharynx: Oropharynx is clear.   Eyes:      Extraocular Movements: Extraocular movements intact.      Conjunctiva/sclera: Conjunctivae normal.   Cardiovascular:      Rate and Rhythm: Normal rate and regular rhythm.      Heart sounds: No murmur heard.  Pulmonary:      Effort: Pulmonary effort is normal.      Breath sounds: Normal breath sounds. No wheezing, rhonchi or rales.   Abdominal:      General: Abdomen is  flat. Bowel sounds are normal.      Palpations: Abdomen is soft.   Musculoskeletal:      Right lower leg: No edema.      Left lower leg: No edema.   Skin:     General: Skin is warm and dry.   Neurological:      Mental Status: He is lethargic.        Recent Labs   Lab 09/16/22 2035   WBC 10.3   HGB 15.2   HCT 46.7   *   .4*   INR 1.15        Recent Labs   Lab 09/16/22 2035      K 4.5   CHLORIDE 107   CO2 27   BUN 32.0*   CREATININE 1.49*   GLUCOSE 131*   CALCIUM 9.1           ASSESSMENT/PLAN:     Patient Active Problem List    Diagnosis Date Noted    Closed fracture of right hip 09/16/2022    Encephalopathy, metabolic 09/18/2022    Paroxysmal A-fib 09/17/2022    Dementia 09/18/2022    Rash 09/17/2022        - cont current  - f/u labs  - monitor  - PT  - rehab on discharge      VTE Risk Mitigation (From admission, onward)           Ordered     enoxaparin injection 30 mg  Daily         09/16/22 2307     IP VTE HIGH RISK PATIENT  Once         09/16/22 2307     Place [facility]  Until discontinued         09/16/22 2307                           Gus Hewitt MD  [facility]   [facility]

## 2022-09-19 NOTE — HOSPITAL COURSE
9/18/22  Pt has remained lethargic and unarousable since surgery yesterday. Seem to attempt to wake up and mumbles with rough stimulation. Labs were ordered but not drawn this morning. Labs reordered and currently pending.    9/19/22  Upon entering room, son was holding emesis basin for pt as he was sitting up reclined leaning over to right with audible gurgling and spitting up a bit; no real vomitus. He was also coughing as well but not forceful enough to be effective. Son states pt has been awake and alert this morning. He ate a little breakfast and had just eaten some muffin f/b water. Son states RT then came in and had pt use IS and that's when he thinks he sucked in some food and began gagging and coughing. RT states pt had gurgling noises before IS attempted and wasn't able to perform d/t incoordination. RT then deep suctioned using [place]uer and pt cleared.    9/20/22.   patient can be somnolent.  Family stated that episodes of emesis at home and questionable choking with liquids.  This has been happened several times at home   Continue to monitor patient closely  Will have speech to evaluate to deter will be the safest food take by mouth.  Discussed case with family members and eventually they are not recommending any PEG tube placement at this time   Will work with  toward a skilled nursing facility treatment.  09/21/2022.    Patient continues to be somnolent.    Speech evaluation persistent with aspirations not good swelling.  Chest x-ray confirms patient aspirates   Keep patient NPO   Hold IV fluid since patient has been on Lasix in the past questionable history of congestion CHF  Will check proBNP in the morning repeat chest x-ray   Start patient on Zosyn for aspiration pneumonia.    Discussed case with family and .  Most likely this will be an LTAC case.  Patient is do not resuscitate code status her patient's and family member.    9/22/22.    Patient more awake alert today.   Follows few commands.  Still anxious and confused.    Discussed case with family members.    Will start patient on low-dose IV fluid.  Follow up with speech.    Regardless of outcome family members do not want patient to have a PEG.      9/23/22.    No significant changes compared to yesterday.    Discussed with family plan.  Since no need for PEG placement per patient's request we were not going to have a swallow study done and modified barium.    will try to treat patient with thickened liquids as tolerated.  Family are willing to consider alternative medical treatment including comfort measures.  Will continue IV antibiotics   continue nebulizer treatment  Follow-up the next 24-48 hours.    9/24/22.    Patient awake.  Alert follows few commands.  Still confused overall.  Family at bedside  Since not eating for a while family has advised to start something p.o. despite patient risk of aspiration.    They do not advise any artificial feeding including PPN or PEG tube feeding.    Continue gentle hydration IV.  Thickened liquid and pureed diet.  Follow up in the morning   Continue IV antibiotic.    9/25/22  Patient doing the same. Min PO yesterday. Still episodes of aspiration.   Will continue IV antibiotic and dc in am.   Family request no artificial feeding at this point.     9/26/22.    Patient more awake and alert today.    Continue current hospital treatment.  PT OT for the hip surgery.    Discussed with family and  disposition.  Patient is DNR but family not agreeable to hospice at this point    9/27/22  No new complaints or acute events. Alert. Pt was +COVID on screening for SNF, now unable to go for 10 more days.

## 2022-09-19 NOTE — PT/OT/SLP PROGRESS
Physical Therapy Treatment    Patient Name:  Blaze Turner   MRN:  [id number]    Recommendations:     Discharge Recommendations:  nursing facility, skilled   Discharge Equipment Recommendations:     Barriers to discharge:  medical condition    Assessment:     Blaze Turner is a 89 y.o. male admitted with a medical diagnosis of Closed fracture of right hip.  He presents with the following impairments/functional limitations:  weakness, impaired endurance, gait instability, impaired balance, pain, impaired cognition, decreased safety awareness .    Pt confused and fidgeting but agreeable to get up OOB. He required max A, unable to follow directions. Once st EOB, he required min-mod A to maintain balance. Pt flexed and leaning far forward while sitting. Attempted standing to AD, unsuccessful due to pt cognition. With max A, he was transferred to chair. LE exercises reviewed with family member.    Rehab Prognosis: Fair; patient would benefit from acute skilled PT services to address these deficits and reach maximum level of function.    Recent Surgery: Procedure(s) (LRB):  ENDOPROSTHESIS (Right) 2 Days Post-Op    Plan:     During this hospitalization, patient to be seen BID to address the identified rehab impairments via gait training, therapeutic activities, therapeutic exercises and progress toward the following goals:    Plan of Care Expires:  10/17/22    Subjective     Chief Complaint: pain  Patient/Family Comments/goals: to increase I with functional mobility  Pain/Comfort:  Pain Rating 1: other (see comments) (grimacing)  Location - Side 1: Right  Location 1: hip      Objective:     Communicated with patient prior to session.  Patient found HOB elevated with peripheral IV upon PT entry to room.     General Precautions: Standard, fall   Orthopedic Precautions:RLE partial weight bearing   Braces:    Respiratory Status: Room air     Functional Mobility:  Bed Mobility:     Supine to Sit: maximal  assistance  Transfers:     Sit to Stand:  maximal assistance with rolling walker  Bed to Chair: maximal assistance with  no AD and PWB RLE  using  Squat Pivot  Balance: max A standing, min-mod A unsupported sitting      AM-PAC 6 CLICK MOBILITY          Therapeutic Activities and Exercises:   See above    Patient left up in chair with all lines intact and call button in reach..    GOALS:   Multidisciplinary Problems       Physical Therapy Goals          Problem: Physical Therapy    Goal Priority Disciplines Outcome Goal Variances Interventions   Physical Therapy Goal     PT, PT/OT Ongoing, Progressing     Description: Goals to be met by: 10/17/22     Patient will increase functional independence with mobility by performin. Supine to sit with MInimal Assistance  2. Sit to stand transfer with Minimal Assistance  3. Gait  x 150 feet with Contact Guard Assistance using Rolling Walker.                          Time Tracking:     PT Received On: 22  PT Start Time: 1620     PT Stop Time: 1640  PT Total Time (min): 20 min     Billable Minutes: Therapeutic Activity 20    Treatment Type: Treatment  PT/PTA: PTA           2022

## 2022-09-19 NOTE — PLAN OF CARE
Spoke to daughter about SNF.  Pt not following commands w/PT.  She asked to send referral to [facility], 1st choice, [facility] 2nd choice and [facility] 3rd choice.  Messaged Nelida at [facility] for 142.  Referrals sent.

## 2022-09-20 PROBLEM — G93.41 ENCEPHALOPATHY, METABOLIC: Chronic | Status: ACTIVE | Noted: 2022-09-18

## 2022-09-20 PROBLEM — E44.0 MALNUTRITION OF MODERATE DEGREE: Chronic | Status: ACTIVE | Noted: 2022-09-20

## 2022-09-20 PROBLEM — T17.908A CHRONIC PULMONARY ASPIRATION: Chronic | Status: ACTIVE | Noted: 2022-09-20

## 2022-09-20 PROBLEM — I48.0 PAROXYSMAL A-FIB: Chronic | Status: ACTIVE | Noted: 2022-09-17

## 2022-09-20 PROBLEM — S72.001A CLOSED FRACTURE OF RIGHT HIP: Chronic | Status: ACTIVE | Noted: 2022-09-16

## 2022-09-20 LAB
ALBUMIN SERPL-MCNC: 2.5 GM/DL (ref 3.4–4.8)
BASOPHILS # BLD AUTO: 0.09 X10(3)/MCL (ref 0–0.2)
BASOPHILS NFR BLD AUTO: 0.7 %
BUN SERPL-MCNC: 17 MG/DL (ref 8.4–25.7)
CALCIUM SERPL-MCNC: 8.2 MG/DL (ref 8.8–10)
CHLORIDE SERPL-SCNC: 107 MMOL/L (ref 98–111)
CO2 SERPL-SCNC: 29 MMOL/L (ref 23–31)
CREAT SERPL-MCNC: 1.11 MG/DL (ref 0.73–1.18)
EOSINOPHIL # BLD AUTO: 1.68 X10(3)/MCL (ref 0–0.9)
EOSINOPHIL NFR BLD AUTO: 13.2 %
ERYTHROCYTE [DISTWIDTH] IN BLOOD BY AUTOMATED COUNT: 14.2 % (ref 11.5–17)
GFR SERPLBLD CREATININE-BSD FMLA CKD-EPI: >60 MLS/MIN/1.73/M2
GLUCOSE SERPL-MCNC: 103 MG/DL (ref 75–121)
HCT VFR BLD AUTO: 31.7 % (ref 42–52)
HGB BLD-MCNC: 10.2 GM/DL (ref 14–18)
IMM GRANULOCYTES # BLD AUTO: 0.21 X10(3)/MCL (ref 0–0.04)
IMM GRANULOCYTES NFR BLD AUTO: 1.6 %
LYMPHOCYTES # BLD AUTO: 1.37 X10(3)/MCL (ref 0.6–4.6)
LYMPHOCYTES NFR BLD AUTO: 10.7 %
MAGNESIUM SERPL-MCNC: 1.8 MG/DL (ref 1.6–2.6)
MCH RBC QN AUTO: 32.5 PG (ref 27–31)
MCHC RBC AUTO-ENTMCNC: 32.2 MG/DL (ref 33–36)
MCV RBC AUTO: 101 FL (ref 80–94)
MONOCYTES # BLD AUTO: 1.2 X10(3)/MCL (ref 0.1–1.3)
MONOCYTES NFR BLD AUTO: 9.4 %
NEUTROPHILS # BLD AUTO: 8.2 X10(3)/MCL (ref 2.1–9.2)
NEUTROPHILS NFR BLD AUTO: 64.4 %
PHOSPHATE SERPL-MCNC: 1.4 MG/DL (ref 2.3–4.7)
PLATELET # BLD AUTO: 102 X10(3)/MCL (ref 130–400)
PMV BLD AUTO: 12.9 FL (ref 7.4–10.4)
POTASSIUM SERPL-SCNC: 4.9 MMOL/L (ref 3.5–5.1)
RBC # BLD AUTO: 3.14 X10(6)/MCL (ref 4.7–6.1)
SODIUM SERPL-SCNC: 142 MMOL/L (ref 132–146)
WBC # SPEC AUTO: 12.8 X10(3)/MCL (ref 4.5–11.5)

## 2022-09-20 PROCEDURE — 92610 EVALUATE SWALLOWING FUNCTION: CPT

## 2022-09-20 PROCEDURE — 80069 RENAL FUNCTION PANEL: CPT | Performed by: INTERNAL MEDICINE

## 2022-09-20 PROCEDURE — 11000001 HC ACUTE MED/SURG PRIVATE ROOM

## 2022-09-20 PROCEDURE — 94761 N-INVAS EAR/PLS OXIMETRY MLT: CPT

## 2022-09-20 PROCEDURE — 63600175 PHARM REV CODE 636 W HCPCS: Performed by: INTERNAL MEDICINE

## 2022-09-20 PROCEDURE — 36415 COLL VENOUS BLD VENIPUNCTURE: CPT | Performed by: INTERNAL MEDICINE

## 2022-09-20 PROCEDURE — 85025 COMPLETE CBC W/AUTO DIFF WBC: CPT | Performed by: INTERNAL MEDICINE

## 2022-09-20 PROCEDURE — 25000003 PHARM REV CODE 250: Performed by: INTERNAL MEDICINE

## 2022-09-20 PROCEDURE — 31720 CLEARANCE OF AIRWAYS: CPT

## 2022-09-20 PROCEDURE — 97530 THERAPEUTIC ACTIVITIES: CPT

## 2022-09-20 PROCEDURE — 83735 ASSAY OF MAGNESIUM: CPT | Performed by: INTERNAL MEDICINE

## 2022-09-20 PROCEDURE — 99900035 HC TECH TIME PER 15 MIN (STAT)

## 2022-09-20 RX ADMIN — ENOXAPARIN SODIUM 30 MG: 30 INJECTION SUBCUTANEOUS at 04:09

## 2022-09-20 RX ADMIN — MUPIROCIN: 20 OINTMENT TOPICAL at 08:09

## 2022-09-20 RX ADMIN — FAMOTIDINE 20 MG: 10 INJECTION, SOLUTION INTRAVENOUS at 10:09

## 2022-09-20 NOTE — ASSESSMENT & PLAN NOTE
Nutrition consulted. Most recent weight and BMI monitored-                         Measurements:  Wt Readings from Last 1 Encounters:   09/16/22 67.6 kg (149 lb)   Body mass index is 21.38 kg/m².    Recommendations:      Patient has been screened and assessed by RD. RD will follow patient.

## 2022-09-20 NOTE — ASSESSMENT & PLAN NOTE
Suspect chronic pulmonary aspiration with atelectasis.    Will have speech re-evaluate patient in the morning.  Check magnesium, phos and pre-albumin in the morning

## 2022-09-20 NOTE — PROGRESS NOTES
[facility] Medicine  Progress Note    Patient Name: Blaze Turner  MRN: [id number]  Patient Class: IP- Inpatient   Admission Date: 9/16/2022  Length of Stay: 4 days  Attending Physician: Gus Hewitt MD  Primary Care Provider: Gaudencio Munguia MD        Subjective:     Principal Problem:Closed fracture of right hip        HPI:  89-year-old male with hx Dementia,  HTN, Trigeminal neuralgia, Afib, CKD stage 3 baseline Cr 1.5, brought in by his son after fall at home patient states he could not bear weight he was complaining of significant right hip pain so the son brought him to the emergency department.  No LOC, no chest pain, no F/C, no chest pain per daughter. PT takesTrileptal 150 mg daily per daughter. Pt has developed rash for past 5 days per daughter who stays with him. Pt was scratching and agitated he was give Ativan in Ed, now sleeping. Daughter at bedside. Pt is DNR per daughter          Overview/Hospital Course:    9/18/22  Pt has remained lethargic and unarousable since surgery yesterday. Seem to attempt to wake up and mumbles with rough stimulation. Labs were ordered but not drawn this morning. Labs reordered and currently pending.    9/19/22  Upon entering room, son was holding emesis basin for pt as he was sitting up reclined leaning over to right with audible gurgling and spitting up a bit; no real vomitus. He was also coughing as well but not forceful enough to be effective. Son states pt has been awake and alert this morning. He ate a little breakfast and had just eaten some muffin f/b water. Son states RT then came in and had pt use IS and that's when he thinks he sucked in some food and began gagging and coughing. RT states pt had gurgling noises before IS attempted and wasn't able to perform d/t incoordination. RT then deep suctioned using Yonkuer and pt cleared.    09/20/2022.   patient can be somnolent.  Family stated that episodes of emesis  at home and questionable choking with liquids.  This has been happened several times at home   Continue to monitor patient closely  Will have speech to evaluate to deter will be the safest food take by mouth.  Discussed case with family members and eventually they are not recommending any PEG tube placement at this time   Will work with  toward a skilled nursing facility treatment.        Interval History:  Patient very weak tired not able to provide much information.  Family member stated that patient had an episode of cough questionable choking which has happened at home in the past as well.    Review of Systems   Reason unable to perform ROS: Patient very somnolent unable to provide much information he is weak and tired.   Objective:     Vital Signs (Most Recent):  Temp: 98.3 °F (36.8 °C) (09/20/22 1105)  Pulse: 80 (09/20/22 1105)  Resp: 20 (09/20/22 0914)  BP: 129/72 (09/20/22 1105)  SpO2: 96 % (09/20/22 1105) Vital Signs (24h Range):  Temp:  [96.8 °F (36 °C)-98.6 °F (37 °C)] 98.3 °F (36.8 °C)  Pulse:  [80-93] 80  Resp:  [20] 20  SpO2:  [95 %-98 %] 96 %  BP: (113-144)/(61-72) 129/72     Weight: 67.6 kg (149 lb)  Body mass index is 21.38 kg/m².  No intake or output data in the 24 hours ending 09/20/22 1440   Physical Exam  Vitals and nursing note reviewed.   Constitutional:       Appearance: He is underweight. He is ill-appearing. He is not toxic-appearing or diaphoretic.   HENT:      Head: Normocephalic. Hair is normal.   Eyes:      General: Lids are normal. Lids are everted, no foreign bodies appreciated.   Cardiovascular:      Rate and Rhythm: Normal rate and regular rhythm. FrequentExtrasystoles are present.     Pulses: Normal pulses.   Pulmonary:      Effort: No tachypnea or bradypnea.      Breath sounds: Decreased air movement present. Examination of the right-upper field reveals wheezing and rhonchi. Examination of the left-upper field reveals wheezing and rhonchi. Wheezing and rhonchi present.    Abdominal:      General: Abdomen is flat. Bowel sounds are decreased. There is no distension.      Palpations: Abdomen is soft.   Musculoskeletal:      Cervical back: Full passive range of motion without pain, normal range of motion and neck supple.      Comments: There is loss of muscle mass in the upper lower extremities including strength.   Skin:     General: Skin is warm.      Capillary Refill: Capillary refill takes less than 2 seconds.      Comments: There is a loss of subcutaneous tissues.  Right hip with ecchymosis/  swelling of the right hip status post surgery.   Neurological:      Mental Status: He is lethargic and disoriented.       Significant Labs: All pertinent labs within the past 24 hours have been reviewed.  Recent Lab Results         09/20/22  0341        Albumin 2.5       Baso # 0.09       Basophil % 0.7       BUN 17.0       Calcium 8.2       Chloride 107       CO2 29       Creatinine 1.11       eGFR >60       Eos # 1.68       Eosinophil % 13.2       Glucose 103       Hematocrit 31.7       Hemoglobin 10.2       Immature Grans (Abs) 0.21       Immature Granulocytes 1.6       Lymph # 1.37       LYMPH % 10.7       Magnesium 1.80       MCH 32.5       MCHC 32.2       .0       Mono # 1.20       Mono % 9.4       MPV 12.9       Neut # 8.2       Neut % 64.4       Phosphorus 1.4       Platelets 102       Potassium 4.9       RBC 3.14       RDW 14.2       Sodium 142       WBC 12.8               Significant Imaging: XR CHEST 1 VIEW     CLINICAL HISTORY:  poss. aspiration;, .     COMPARISON:  03/23/2022     FINDINGS:  An AP view or more reveals heart to be borderline enlarged.  Patient rotated exam.  There is prominence and tortuosity of the aortic arch.  Inspiration is less than optimal.  The chin/has partially obscuring the right upper and left upper lung.  Atelectasis and/or scarring is evident the lower lungs.  No consolidative infiltrate or effusion is seen.  Degenerative changes are noted to  the thoracic spine.  Bony structures are osteopenic.     Impression:     1. Significantly limited exam secondary to suboptimal positioning  2. Borderline cardiomegaly  3. Atelectasis and/or scarring lower lungs bilaterally  4. Thoracic spondylosis  5. Osteopenia        Electronically signed by: Tim Odonnell  Date:                                            09/20/2022  Time:                                           13:59        Assessment/Plan:      * Closed fracture of right hip  Continue with postop order.  Follow up with PT OT.      Encephalopathy, metabolic  Suspect to be hospital psychosis on top of patient's baseline dementia continue supportive care.      Chronic pulmonary aspiration  Suspect chronic pulmonary aspiration with atelectasis.    Will have speech re-evaluate patient in the morning.  Check magnesium, phos and pre-albumin in the morning      Malnutrition of moderate degree  Nutrition consulted. Most recent weight and BMI monitored-                         Measurements:  Wt Readings from Last 1 Encounters:   09/16/22 67.6 kg (149 lb)   Body mass index is 21.38 kg/m².    Recommendations:      Patient has been screened and assessed by RD. RD will follow patient.      Dementia  Continue supportive care      Rash  Likely due to meds, ? Trileptal, may apply cortisol cream PRN      Paroxysmal A-fib  Not on any meds, rate controlled.  Telemetry      VTE Risk Mitigation (From admission, onward)         Ordered     enoxaparin injection 30 mg  Daily         09/16/22 2307     IP VTE HIGH RISK PATIENT  Once         09/16/22 2307     Place EUGENIA hose  Until discontinued         09/16/22 2307                Discharge Planning   LEON:      Code Status: Full Code   Is the patient medically ready for discharge?:     Reason for patient still in hospital (select all that apply): Treatment                     Alok Murrieta MD  Department of Hospital Medicine   [facility]

## 2022-09-20 NOTE — PT/OT/SLP PROGRESS
Physical Therapy Treatment    Patient Name:  Blaze Turner   MRN:  [id number]    Recommendations:     Discharge Recommendations:  nursing facility, skilled   Discharge Equipment Recommendations:     Barriers to discharge:  medical condition    Assessment:     Blaze Turner is a 89 y.o. male admitted with a medical diagnosis of Closed fracture of right hip.  He presents with the following impairments/functional limitations:  weakness, impaired endurance, impaired functional mobility, impaired self care skills, gait instability, impaired balance, impaired cognition, decreased lower extremity function, decreased safety awareness, pain, decreased ROM .    Pt confused and required suction due to mucous built up in the back of his throat. Seems like he is have a lot of difficulty clearing secretions. He required Max A for all mobility tasks and is still unable to follow directions. He continues to exhibit a flexed posture while sitting EOB. He transferred to chair with Max A.    Rehab Prognosis: Fair; patient would benefit from acute skilled PT services to address these deficits and reach maximum level of function.    Recent Surgery: Procedure(s) (LRB):  ARTHROPLASTY, HIP REPLACEMENT (Endo Hip) (Right) 3 Days Post-Op    Plan:     During this hospitalization, patient to be seen BID to address the identified rehab impairments via gait training, therapeutic activities, therapeutic exercises and progress toward the following goals:    Plan of Care Expires:  10/17/22    Subjective     Chief Complaint: pain  Patient/Family Comments/goals: to increase I with functional mobility  Pain/Comfort:         Objective:     Communicated with patient prior to session.  Patient found HOB elevated with   upon PT entry to room.     General Precautions: Standard, fall   Orthopedic Precautions:RLE partial weight bearing   Braces:    Respiratory Status: Room air     Functional Mobility:  Bed Mobility:     Supine to Sit: maximal  assistance  Transfers:     Sit to Stand:  maximal assistance with rolling walker  Bed to Chair: maximal assistance with  no AD and PWB RLE  using  Squat Pivot  Balance: max A standing, min-mod A unsupported sitting      AM-PAC 6 CLICK MOBILITY          Therapeutic Activities and Exercises:   See above    Patient left up in chair with all lines intact and call button in reach..    GOALS:   Multidisciplinary Problems       Physical Therapy Goals          Problem: Physical Therapy    Goal Priority Disciplines Outcome Goal Variances Interventions   Physical Therapy Goal     PT, PT/OT Ongoing, Progressing     Description: Goals to be met by: 10/17/22     Patient will increase functional independence with mobility by performin. Supine to sit with MInimal Assistance  2. Sit to stand transfer with Minimal Assistance  3. Gait  x 150 feet with Contact Guard Assistance using Rolling Walker.                          Time Tracking:     PT Received On: 22  PT Start Time: 900     PT Stop Time: 920  PT Total Time (min): 20min     Billable Minutes: Therapeutic Activity 20    Treatment Type: Evaluation  PT/PTA: PT           2022

## 2022-09-20 NOTE — SUBJECTIVE & OBJECTIVE
Interval History:  Patient very weak tired not able to provide much information.  Family member stated that patient had an episode of cough questionable choking which has happened at home in the past as well.    Review of Systems   Reason unable to perform ROS: Patient very somnolent unable to provide much information he is weak and tired.   Objective:     Vital Signs (Most Recent):  Temp: 98.3 °F (36.8 °C) (09/20/22 1105)  Pulse: 80 (09/20/22 1105)  Resp: 20 (09/20/22 0914)  BP: 129/72 (09/20/22 1105)  SpO2: 96 % (09/20/22 1105) Vital Signs (24h Range):  Temp:  [96.8 °F (36 °C)-98.6 °F (37 °C)] 98.3 °F (36.8 °C)  Pulse:  [80-93] 80  Resp:  [20] 20  SpO2:  [95 %-98 %] 96 %  BP: (113-144)/(61-72) 129/72     Weight: 67.6 kg (149 lb)  Body mass index is 21.38 kg/m².  No intake or output data in the 24 hours ending 09/20/22 1440   Physical Exam  Vitals and nursing note reviewed.   Constitutional:       Appearance: He is underweight. He is ill-appearing. He is not toxic-appearing or diaphoretic.   HENT:      Head: Normocephalic. Hair is normal.   Eyes:      General: Lids are normal. Lids are everted, no foreign bodies appreciated.   Cardiovascular:      Rate and Rhythm: Normal rate and regular rhythm. FrequentExtrasystoles are present.     Pulses: Normal pulses.   Pulmonary:      Effort: No tachypnea or bradypnea.      Breath sounds: Decreased air movement present. Examination of the right-upper field reveals wheezing and rhonchi. Examination of the left-upper field reveals wheezing and rhonchi. Wheezing and rhonchi present.   Abdominal:      General: Abdomen is flat. Bowel sounds are decreased. There is no distension.      Palpations: Abdomen is soft.   Musculoskeletal:      Cervical back: Full passive range of motion without pain, normal range of motion and neck supple.      Comments: There is loss of muscle mass in the upper lower extremities including strength.   Skin:     General: Skin is warm.      Capillary  Refill: Capillary refill takes less than 2 seconds.      Comments: There is a loss of subcutaneous tissues.  Right hip with ecchymosis/  swelling of the right hip status post surgery.   Neurological:      Mental Status: He is lethargic and disoriented.       Significant Labs: All pertinent labs within the past 24 hours have been reviewed.  Recent Lab Results         09/20/22  0341        Albumin 2.5       Baso # 0.09       Basophil % 0.7       BUN 17.0       Calcium 8.2       Chloride 107       CO2 29       Creatinine 1.11       eGFR >60       Eos # 1.68       Eosinophil % 13.2       Glucose 103       Hematocrit 31.7       Hemoglobin 10.2       Immature Grans (Abs) 0.21       Immature Granulocytes 1.6       Lymph # 1.37       LYMPH % 10.7       Magnesium 1.80       MCH 32.5       MCHC 32.2       .0       Mono # 1.20       Mono % 9.4       MPV 12.9       Neut # 8.2       Neut % 64.4       Phosphorus 1.4       Platelets 102       Potassium 4.9       RBC 3.14       RDW 14.2       Sodium 142       WBC 12.8               Significant Imaging: XR CHEST 1 VIEW     CLINICAL HISTORY:  poss. aspiration;, .     COMPARISON:  03/23/2022     FINDINGS:  An AP view or more reveals heart to be borderline enlarged.  Patient rotated exam.  There is prominence and tortuosity of the aortic arch.  Inspiration is less than optimal.  The chin/has partially obscuring the right upper and left upper lung.  Atelectasis and/or scarring is evident the lower lungs.  No consolidative infiltrate or effusion is seen.  Degenerative changes are noted to the thoracic spine.  Bony structures are osteopenic.     Impression:     1. Significantly limited exam secondary to suboptimal positioning  2. Borderline cardiomegaly  3. Atelectasis and/or scarring lower lungs bilaterally  4. Thoracic spondylosis  5. Osteopenia        Electronically signed by: Tim Odonnell  Date:                                            09/20/2022  Time:                                            13:59

## 2022-09-20 NOTE — PT/OT/SLP PROGRESS
Physical Therapy Treatment    Patient Name:  Blaze Turner   MRN:  [id number]    Recommendations:     Discharge Recommendations:  nursing facility, skilled   Discharge Equipment Recommendations:     Barriers to discharge:  medical condition    Assessment:     Blaze Turner is a 89 y.o. male admitted with a medical diagnosis of Closed fracture of right hip.  He presents with the following impairments/functional limitations:  weakness, impaired endurance, gait instability, impaired balance, pain, impaired cognition, decreased safety awareness .    Pt confused but more alert and cooperative this afternoon. He required max A for bed mobility and for transfer to recliner. He has difficulty following directions. He then was able to stand several times, pulling up to bedrail with max A x 2 with assistance to maintain PWB. Improved participation but still limited by cognition.    Rehab Prognosis: Fair; patient would benefit from acute skilled PT services to address these deficits and reach maximum level of function.    Recent Surgery: Procedure(s) (LRB):  ARTHROPLASTY, HIP REPLACEMENT (Endo Hip) (Right) 3 Days Post-Op    Plan:     During this hospitalization, patient to be seen BID to address the identified rehab impairments via gait training, therapeutic activities, therapeutic exercises and progress toward the following goals:    Plan of Care Expires:  10/17/22    Subjective     Chief Complaint: pain  Patient/Family Comments/goals: to increase I with functional mobility  Pain/Comfort:         Objective:     Communicated with patient prior to session.  Patient found HOB elevated with peripheral IV upon PT entry to room.     General Precautions: Standard, fall   Orthopedic Precautions:RLE partial weight bearing   Braces:    Respiratory Status: Room air     Functional Mobility:  Bed Mobility:     Supine to Sit: maximal assistance  Transfers:     Sit to Stand:  maximal assistance with pulling to bedrail  Bed to Chair:  maximal assistance with  no AD and PWB RLE  using  Squat Pivot  Balance: max A standing, min-mod A unsupported sitting      AM-PAC 6 CLICK MOBILITY          Therapeutic Activities and Exercises:   See above    Patient left up in chair with all lines intact and call button in reach..    GOALS:   Multidisciplinary Problems       Physical Therapy Goals          Problem: Physical Therapy    Goal Priority Disciplines Outcome Goal Variances Interventions   Physical Therapy Goal     PT, PT/OT Ongoing, Progressing     Description: Goals to be met by: 10/17/22     Patient will increase functional independence with mobility by performin. Supine to sit with MInimal Assistance  2. Sit to stand transfer with Minimal Assistance  3. Gait  x 150 feet with Contact Guard Assistance using Rolling Walker.                          Time Tracking:     PT Received On: 22  PT Start Time: 1300    PT Stop Time: 1320  PT Total Time (min): 20min     Billable Minutes: Therapeutic Activity 20    Treatment Type: Treatment  PT/PTA: PTA           2022

## 2022-09-20 NOTE — ASSESSMENT & PLAN NOTE
Suspect to be hospital psychosis on top of patient's baseline dementia continue supportive care.

## 2022-09-20 NOTE — PROGRESS NOTES
Speech Language Pathology  Evaluation    Blaze Turner   MRN: [id number]   Admitting Diagnosis: Closed fracture of right hip    Diet recommendations: Solid Diet Level: (P) NPO  Liquid Diet Level: (P) NPO         TREATMENT BILLABLE MINUTES:  Eval Swallow and Oral Function 60    Diagnosis: Closed fracture of right hip      Past Medical History:   Diagnosis Date    Trigeminal neuralgia      Past Surgical History:   Procedure Laterality Date    HIP REPLACEMENT ARTHROPLASTY Right 9/17/2022    Procedure: ARTHROPLASTY, HIP REPLACEMENT (Endo Hip);  Surgeon: Alex Sesay MD;  Location: [facility];  Service: Orthopedics;  Laterality: Right;       Has the patient been evaluated by SLP for swallowing? : (P) Yes  Keep patient NPO?: (P) Yes   General Precautions: Standard, (P) aspiration          Social Hx: Patient lives with children.    Prior diet: soft and TL. Pt's daughter reported she had started to notice overt s/s of aspiration prior to fall. Pt's daughter reported living will refuses PEG placement.     Subjective:  Pt started off alert and ended up presenting lethargy when ST and daughter discussed POT. Pt presented increased difficulty following verbal directions.       Objective: Clinical assessment of swallow function.         Bedside Swallow Eval:  Consistencies Assessed: Thin liquids ice chips  Puree thick pudding    Oral Phase: WFL    Pharyngeal Phase: coughing that was very weak and non-productive  multiple spontaneous swallows  throat clearing  wet vocal quality after swallow    Additional Treatment: ST and Pt's daughter discussed BSE results, risks of aspiration, recommendation of NPO/MBSS, and Pt's daughter verbalized agreement. Nursing also edu ST recommendations and verbalized agreement.       Assessment:  Blaze Turner is a 89 y.o. male with a medical diagnosis of Closed fracture of right hip and presents with overt s/s of aspiration.          Discharge recommendations: Discharge Facility/Level of  Care Needs: (P) nursing facility, skilled     Diet recommendations:    Liquid Diet Level: (P) NPO       Plan:   Patient to be seen  9/21/2022  Planned Interventions:    [facility] [place]  Plan of Care reviewed with:  Pt's daughter and nursing  SLP Follow-up?: (P) Yes  SLP - Next Visit Date: (P) 09/21/22 (MBSS)             09/20/2022

## 2022-09-21 PROBLEM — J69.0 ASPIRATION PNEUMONITIS: Status: ACTIVE | Noted: 2022-09-21

## 2022-09-21 PROBLEM — J69.0 ASPIRATION PNEUMONITIS: Chronic | Status: ACTIVE | Noted: 2022-09-21

## 2022-09-21 PROBLEM — Z66 DNR (DO NOT RESUSCITATE): Chronic | Status: ACTIVE | Noted: 2022-09-21

## 2022-09-21 LAB
ALBUMIN SERPL-MCNC: 2.7 GM/DL (ref 3.4–4.8)
ALBUMIN/GLOB SERPL: 0.9 RATIO (ref 1.1–2)
ALP SERPL-CCNC: 75 UNIT/L (ref 40–150)
ALT SERPL-CCNC: 23 UNIT/L (ref 0–55)
AST SERPL-CCNC: 36 UNIT/L (ref 5–34)
BASOPHILS # BLD AUTO: 0.12 X10(3)/MCL (ref 0–0.2)
BASOPHILS NFR BLD AUTO: 1.3 %
BILIRUBIN DIRECT+TOT PNL SERPL-MCNC: 1.9 MG/DL
BNP BLD-MCNC: 436.5 PG/ML
BUN SERPL-MCNC: 19 MG/DL (ref 8.4–25.7)
CALCIUM SERPL-MCNC: 8.5 MG/DL (ref 8.8–10)
CHLORIDE SERPL-SCNC: 106 MMOL/L (ref 98–111)
CO2 SERPL-SCNC: 28 MMOL/L (ref 23–31)
CREAT SERPL-MCNC: 0.98 MG/DL (ref 0.73–1.18)
CRP SERPL-MCNC: 178.9 MG/L
EOSINOPHIL # BLD AUTO: 0.87 X10(3)/MCL (ref 0–0.9)
EOSINOPHIL NFR BLD AUTO: 9.2 %
ERYTHROCYTE [DISTWIDTH] IN BLOOD BY AUTOMATED COUNT: 14 % (ref 11.5–17)
ESTROGEN SERPL-MCNC: NORMAL PG/ML
GFR SERPLBLD CREATININE-BSD FMLA CKD-EPI: >60 MLS/MIN/1.73/M2
GLOBULIN SER-MCNC: 3 GM/DL (ref 2.4–3.5)
GLUCOSE SERPL-MCNC: 98 MG/DL (ref 75–121)
HCT VFR BLD AUTO: 32.1 % (ref 42–52)
HGB BLD-MCNC: 10.3 GM/DL (ref 14–18)
IMM GRANULOCYTES # BLD AUTO: 0.24 X10(3)/MCL (ref 0–0.04)
IMM GRANULOCYTES NFR BLD AUTO: 2.5 %
INSULIN SERPL-ACNC: NORMAL U[IU]/ML
LAB AP CLINICAL INFORMATION: NORMAL
LAB AP GROSS DESCRIPTION: NORMAL
LAB AP REPORT FOOTNOTES: NORMAL
LYMPHOCYTES # BLD AUTO: 1.14 X10(3)/MCL (ref 0.6–4.6)
LYMPHOCYTES NFR BLD AUTO: 12.1 %
MAGNESIUM SERPL-MCNC: 1.8 MG/DL (ref 1.6–2.6)
MCH RBC QN AUTO: 32.3 PG (ref 27–31)
MCHC RBC AUTO-ENTMCNC: 32.1 MG/DL (ref 33–36)
MCV RBC AUTO: 100.6 FL (ref 80–94)
MONOCYTES # BLD AUTO: 1.09 X10(3)/MCL (ref 0.1–1.3)
MONOCYTES NFR BLD AUTO: 11.6 %
NEUTROPHILS # BLD AUTO: 6 X10(3)/MCL (ref 2.1–9.2)
NEUTROPHILS NFR BLD AUTO: 63.3 %
PHOSPHATE SERPL-MCNC: 2.1 MG/DL (ref 2.3–4.7)
PLATELET # BLD AUTO: 121 X10(3)/MCL (ref 130–400)
PMV BLD AUTO: 12.4 FL (ref 7.4–10.4)
POTASSIUM SERPL-SCNC: 4 MMOL/L (ref 3.5–5.1)
PREALB SERPL-MCNC: 8.1 MG/DL (ref 16–42)
PROT SERPL-MCNC: 5.7 GM/DL (ref 5.8–7.6)
RBC # BLD AUTO: 3.19 X10(6)/MCL (ref 4.7–6.1)
SODIUM SERPL-SCNC: 142 MMOL/L (ref 132–146)
T3RU NFR SERPL: NORMAL %
WBC # SPEC AUTO: 9.4 X10(3)/MCL (ref 4.5–11.5)

## 2022-09-21 PROCEDURE — 85025 COMPLETE CBC W/AUTO DIFF WBC: CPT | Performed by: EMERGENCY MEDICINE

## 2022-09-21 PROCEDURE — 27000221 HC OXYGEN, UP TO 24 HOURS

## 2022-09-21 PROCEDURE — 63600175 PHARM REV CODE 636 W HCPCS: Performed by: INTERNAL MEDICINE

## 2022-09-21 PROCEDURE — 25000003 PHARM REV CODE 250: Performed by: INTERNAL MEDICINE

## 2022-09-21 PROCEDURE — 36415 COLL VENOUS BLD VENIPUNCTURE: CPT | Performed by: EMERGENCY MEDICINE

## 2022-09-21 PROCEDURE — 25000003 PHARM REV CODE 250: Performed by: SPECIALIST

## 2022-09-21 PROCEDURE — 25000242 PHARM REV CODE 250 ALT 637 W/ HCPCS: Performed by: INTERNAL MEDICINE

## 2022-09-21 PROCEDURE — 84134 ASSAY OF PREALBUMIN: CPT | Performed by: EMERGENCY MEDICINE

## 2022-09-21 PROCEDURE — 25000003 PHARM REV CODE 250: Performed by: EMERGENCY MEDICINE

## 2022-09-21 PROCEDURE — 83880 ASSAY OF NATRIURETIC PEPTIDE: CPT | Performed by: EMERGENCY MEDICINE

## 2022-09-21 PROCEDURE — 94761 N-INVAS EAR/PLS OXIMETRY MLT: CPT

## 2022-09-21 PROCEDURE — 97530 THERAPEUTIC ACTIVITIES: CPT

## 2022-09-21 PROCEDURE — 83735 ASSAY OF MAGNESIUM: CPT | Performed by: EMERGENCY MEDICINE

## 2022-09-21 PROCEDURE — 80053 COMPREHEN METABOLIC PANEL: CPT | Performed by: EMERGENCY MEDICINE

## 2022-09-21 PROCEDURE — 84100 ASSAY OF PHOSPHORUS: CPT | Performed by: EMERGENCY MEDICINE

## 2022-09-21 PROCEDURE — 92611 MOTION FLUOROSCOPY/SWALLOW: CPT

## 2022-09-21 PROCEDURE — 99900035 HC TECH TIME PER 15 MIN (STAT)

## 2022-09-21 PROCEDURE — 31720 CLEARANCE OF AIRWAYS: CPT

## 2022-09-21 PROCEDURE — 11000001 HC ACUTE MED/SURG PRIVATE ROOM

## 2022-09-21 PROCEDURE — 94640 AIRWAY INHALATION TREATMENT: CPT

## 2022-09-21 PROCEDURE — 86140 C-REACTIVE PROTEIN: CPT | Performed by: EMERGENCY MEDICINE

## 2022-09-21 PROCEDURE — 63600175 PHARM REV CODE 636 W HCPCS: Performed by: EMERGENCY MEDICINE

## 2022-09-21 RX ORDER — FUROSEMIDE 10 MG/ML
20 INJECTION INTRAMUSCULAR; INTRAVENOUS DAILY
Status: DISCONTINUED | OUTPATIENT
Start: 2022-09-21 | End: 2022-09-22

## 2022-09-21 RX ORDER — IPRATROPIUM BROMIDE AND ALBUTEROL SULFATE 2.5; .5 MG/3ML; MG/3ML
3 SOLUTION RESPIRATORY (INHALATION) EVERY 6 HOURS
Status: DISCONTINUED | OUTPATIENT
Start: 2022-09-21 | End: 2022-09-22

## 2022-09-21 RX ADMIN — IPRATROPIUM BROMIDE AND ALBUTEROL SULFATE 3 ML: 2.5; .5 SOLUTION RESPIRATORY (INHALATION) at 08:09

## 2022-09-21 RX ADMIN — ENOXAPARIN SODIUM 30 MG: 30 INJECTION SUBCUTANEOUS at 06:09

## 2022-09-21 RX ADMIN — MUPIROCIN: 20 OINTMENT TOPICAL at 10:09

## 2022-09-21 RX ADMIN — FUROSEMIDE 20 MG: 10 INJECTION INTRAMUSCULAR; INTRAVENOUS at 02:09

## 2022-09-21 RX ADMIN — FAMOTIDINE 20 MG: 10 INJECTION, SOLUTION INTRAVENOUS at 09:09

## 2022-09-21 RX ADMIN — MUPIROCIN: 20 OINTMENT TOPICAL at 09:09

## 2022-09-21 RX ADMIN — PIPERACILLIN SODIUM AND TAZOBACTAM SODIUM 4.5 G: 4; 500 INJECTION, POWDER, FOR SOLUTION INTRAVENOUS at 06:09

## 2022-09-21 RX ADMIN — ACETAMINOPHEN 325 MG: 325 SUPPOSITORY RECTAL at 12:09

## 2022-09-21 RX ADMIN — IPRATROPIUM BROMIDE AND ALBUTEROL SULFATE 3 ML: 2.5; .5 SOLUTION RESPIRATORY (INHALATION) at 07:09

## 2022-09-21 RX ADMIN — IPRATROPIUM BROMIDE AND ALBUTEROL SULFATE 3 ML: 2.5; .5 SOLUTION RESPIRATORY (INHALATION) at 01:09

## 2022-09-21 NOTE — PLAN OF CARE
Pt fly have chosen [facility].  Faxed [phone] to Natal[facility]. Pt waiting for speech therapy swallow testing.

## 2022-09-21 NOTE — ASSESSMENT & PLAN NOTE
Aspiration pneumonitis/pneumonia secondary to patient's aspiration.    Will start patient on Zosyn.  C-reactive protein significantly of 80 consistent with infection.

## 2022-09-21 NOTE — ASSESSMENT & PLAN NOTE
Patient and family have wished to make as no other their wishes are for to do not resuscitate patient.  Patient agrees with decision

## 2022-09-21 NOTE — PT/OT/SLP PROGRESS
Physical Therapy Treatment    Patient Name:  Blaze Turner   MRN:  [id number]    Recommendations:     Discharge Recommendations:  nursing facility, skilled   Discharge Equipment Recommendations:     Barriers to discharge:  medical condition    Assessment:     Blaze Turner is a 89 y.o. male admitted with a medical diagnosis of Closed fracture of right hip.  He presents with the following impairments/functional limitations:  weakness, impaired endurance, gait instability, impaired balance, pain, impaired cognition, decreased safety awareness .    Pt alert this afternoon, reports minimal pain. He was able to follow along with BLE exercises with cues. He got up to EOB min A. He was able to stand to RW several times with max A. He has flexed posture but demonstrated improved ability to stand and use AD. Attempted gait however pt had difficulty weight shifting and maintaining balance to advance BLE. He did take a few steps but required max A to get to recliner. Improved alertness and participation this afternoon.    Rehab Prognosis: Fair; patient would benefit from acute skilled PT services to address these deficits and reach maximum level of function.    Recent Surgery: Procedure(s) (LRB):  ARTHROPLASTY, HIP REPLACEMENT (Endo Hip) (Right) 4 Days Post-Op    Plan:     During this hospitalization, patient to be seen BID to address the identified rehab impairments via gait training, therapeutic activities, therapeutic exercises and progress toward the following goals:    Plan of Care Expires:  10/17/22    Subjective     Chief Complaint: pain  Patient/Family Comments/goals: to increase I with functional mobility  Pain/Comfort:         Objective:     Communicated with patient prior to session.  Patient found HOB elevated with peripheral IV upon PT entry to room.     General Precautions: Standard, fall   Orthopedic Precautions:RLE partial weight bearing   Braces:    Respiratory Status: Room air     Functional  Mobility:  Bed Mobility:     Supine to Sit: minimum assistance  Transfers:     Sit to Stand:  maximal assistance with rolling walker  Gait: 2 steps with max A and RW  Balance: min A in sitting, max A standing      AM-PAC 6 CLICK MOBILITY          Therapeutic Activities and Exercises:   See above    Patient left up in chair with all lines intact and call button in reach..    GOALS:   Multidisciplinary Problems       Physical Therapy Goals          Problem: Physical Therapy    Goal Priority Disciplines Outcome Goal Variances Interventions   Physical Therapy Goal     PT, PT/OT Ongoing, Progressing     Description: Goals to be met by: 10/17/22     Patient will increase functional independence with mobility by performin. Supine to sit with MInimal Assistance  2. Sit to stand transfer with Minimal Assistance  3. Gait  x 150 feet with Contact Guard Assistance using Rolling Walker.                          Time Tracking:     PT Received On: 22  PT Start Time: 1255    PT Stop Time: 1315  PT Total Time (min): 20min     Billable Minutes: Therapeutic Activity 20    Treatment Type: Treatment  PT/PTA: PTA           2022

## 2022-09-21 NOTE — PROGRESS NOTES
[facility] Medicine  Progress Note    Patient Name: Blaze Turner  MRN: [id number]  Patient Class: IP- Inpatient   Admission Date: 9/16/2022  Length of Stay: 5 days  Attending Physician: Gus eHwitt MD  Primary Care Provider: Gaudencio Munguia MD        Subjective:     Principal Problem:Closed fracture of right hip        HPI:  89-year-old male with hx Dementia,  HTN, Trigeminal neuralgia, Afib, CKD stage 3 baseline Cr 1.5, brought in by his son after fall at home patient states he could not bear weight he was complaining of significant right hip pain so the son brought him to the emergency department.  No LOC, no chest pain, no F/C, no chest pain per daughter. PT takesTrileptal 150 mg daily per daughter. Pt has developed rash for past 5 days per daughter who stays with him. Pt was scratching and agitated he was give Ativan in Ed, now sleeping. Daughter at bedside. Pt is DNR per daughter          Overview/Hospital Course:    9/18/22  Pt has remained lethargic and unarousable since surgery yesterday. Seem to attempt to wake up and mumbles with rough stimulation. Labs were ordered but not drawn this morning. Labs reordered and currently pending.    9/19/22  Upon entering room, son was holding emesis basin for pt as he was sitting up reclined leaning over to right with audible gurgling and spitting up a bit; no real vomitus. He was also coughing as well but not forceful enough to be effective. Son states pt has been awake and alert this morning. He ate a little breakfast and had just eaten some muffin f/b water. Son states RT then came in and had pt use IS and that's when he thinks he sucked in some food and began gagging and coughing. RT states pt had gurgling noises before IS attempted and wasn't able to perform d/t incoordination. RT then deep suctioned using Yonkuer and pt cleared.    09/20/2022.   patient can be somnolent.  Family stated that episodes of emesis  at home and questionable choking with liquids.  This has been happened several times at home   Continue to monitor patient closely  Will have speech to evaluate to deter will be the safest food take by mouth.  Discussed case with family members and eventually they are not recommending any PEG tube placement at this time   Will work with  toward a skilled nursing facility treatment.  09/21/2022.    Patient continues to be somnolent.    Speech evaluation persistent with aspirations not good swelling.  Chest x-ray confirms patient aspirates   Keep patient NPO   Hold IV fluid since patient has been on Lasix in the past questionable history of congestion CHF  Will check proBNP in the morning repeat chest x-ray   Start patient on Zosyn for aspiration pneumonia.    Discussed case with family and .  Most likely this will be an LTAC case.  Patient is do not resuscitate code status her patient's and family member.      Interval History:  Patient is very weak.  He wakens of respond appropriately but has no energy to provide much information.  Family member said that he has been weak, short of breath, he is coughing.  Swallow study failed.    Review of Systems   Reason unable to perform ROS: Patient is very weak lethargic not able to provide much information.  Majority of complains of being given by family members and nurses.   Constitutional:  Positive for activity change, appetite change and fatigue. Negative for chills.   Respiratory:  Positive for cough, choking and shortness of breath.    Objective:     Vital Signs (Most Recent):  Temp: 98.2 °F (36.8 °C) (09/21/22 1231)  Pulse: 95 (09/21/22 1542)  Resp: 20 (09/21/22 1315)  BP: (!) 112/57 (09/21/22 1542)  SpO2: 99 % (09/21/22 1542)   Vital Signs (24h Range):  Temp:  [98 °F (36.7 °C)-99.6 °F (37.6 °C)] 98.2 °F (36.8 °C)  Pulse:  [] 95  Resp:  [20-28] 20  SpO2:  [92 %-100 %] 99 %  BP: (112-154)/(57-86) 112/57     Weight: 67.6 kg (149 lb)  Body  mass index is 21.38 kg/m².  No intake or output data in the 24 hours ending 09/21/22 7223   Physical Exam  Vitals and nursing note reviewed.   Constitutional:       General: He is in acute distress.      Appearance: He is ill-appearing and diaphoretic.   HENT:      Mouth/Throat:      Mouth: Mucous membranes are dry.   Eyes:      Extraocular Movements: Extraocular movements intact.      Pupils: Pupils are equal, round, and reactive to light.   Cardiovascular:      Rate and Rhythm: Normal rate and regular rhythm.      Heart sounds: Murmur heard.   Pulmonary:      Effort: No respiratory distress.      Breath sounds: Wheezing, rhonchi and rales present.   Abdominal:      General: Abdomen is flat. Bowel sounds are normal.      Palpations: Abdomen is soft.   Musculoskeletal:         General: Normal range of motion.      Cervical back: Normal range of motion.      Right lower leg: Edema present.      Left lower leg: Edema present.   Skin:     Capillary Refill: Capillary refill takes less than 2 seconds.   Neurological:      Mental Status: He is disoriented.   Psychiatric:         Mood and Affect: Mood normal.       Significant Labs: All pertinent labs within the past 24 hours have been reviewed.  Recent Lab Results         09/21/22  0320        Albumin/Globulin Ratio 0.9       Albumin 2.7       Alkaline Phosphatase 75       ALT 23       AST 36       Baso # 0.12       Basophil % 1.3       BILIRUBIN TOTAL 1.9       BUN 19.0       Calcium 8.5       Chloride 106       CO2 28       Creatinine 0.98       .90       eGFR >60       Eos # 0.87       Eosinophil % 9.2       Globulin, Total 3.0       Glucose 98       Hematocrit 32.1       Hemoglobin 10.3       Immature Grans (Abs) 0.24       Immature Granulocytes 2.5       Lymph # 1.14       LYMPH % 12.1       Magnesium 1.80       MCH 32.3       MCHC 32.1       .6       Mono # 1.09       Mono % 11.6       MPV 12.4       Neut # 6.0       Neut % 63.3       Phosphorus 2.1        Platelets 121       Potassium 4.0       Prealbumin 8.1       PROTEIN TOTAL 5.7       RBC 3.19       RDW 14.0       Sodium 142       WBC 9.4               Significant Imaging: I have reviewed all pertinent imaging results/findings within the past 24 hours.      Assessment/Plan:      * Closed fracture of right hip  Continue with postop order.  Follow up with PT OT.      Aspiration pneumonitis  Aspiration pneumonitis/pneumonia secondary to patient's aspiration.    Will start patient on Zosyn.  C-reactive protein significantly of 80 consistent with infection.        Encephalopathy, metabolic  This is a combination of patient's baseline and infection.      Chronic pulmonary aspiration  Currently with aspiration pneumonitis/pneumonia      DNR (do not resuscitate)  Patient and family have wished to make as no other their wishes are for to do not resuscitate patient.  Patient agrees with decision      Malnutrition of moderate degree  Nutrition consulted. Most recent weight and BMI monitored-                         Measurements:  Wt Readings from Last 1 Encounters:   09/16/22 67.6 kg (149 lb)   Body mass index is 21.38 kg/m².    Recommendations:      Patient has been screened and assessed by RD. RD will follow patient.      Dementia  Continue supportive care      Rash  Likely due to meds, ? Trileptal, may apply cortisol cream PRN      Paroxysmal A-fib  Not on any meds, rate controlled.  Telemetry      VTE Risk Mitigation (From admission, onward)         Ordered     enoxaparin injection 30 mg  Daily         09/16/22 2307     IP VTE HIGH RISK PATIENT  Once         09/16/22 2307     Place EUGENIA hose  Until discontinued         09/16/22 2307                Discharge Planning   LEON:      Code Status: Full Code   Is the patient medically ready for discharge?:     Reason for patient still in hospital (select all that apply): Patient unstable                     Alok Murrieta MD  Department of Hospital Medicine   [facility]

## 2022-09-21 NOTE — PROGRESS NOTES
Modifed Barium Swallow Study         09/21/22 1400   General   SLP Attempted Eval Date 09/21/22   SLP Attempted Eval Time 1345   Precautions   Has the patient been evaluated by SLP for swallowing?  Yes   Keep patient NPO? Yes   General Precautions aspiration   Recommendations   Discharge Facility/Level of Care Needs   (Unknowns)   Solid Diet Level NPO   Liquid Diet Level NPO   Plan   Therapy Frequency 3 x/week  (M-F)   SLP Follow-up? Yes   SLP - Next Visit Date 09/22/22               Reason for Referral  Patient was referred for a Modified Barium Swallow Study to assess the efficiency of his/her swallow function, rule out aspiration and make recommendations regarding safe dietary consistencies, effective compensatory strategies, and safe eating environment.     Diagnosis   Closed fracture of right hip    Past Medical History:   Diagnosis Date    Trigeminal neuralgia      Past Surgical History:   Procedure Laterality Date    HIP REPLACEMENT ARTHROPLASTY Right 9/17/2022    Procedure: ARTHROPLASTY, HIP REPLACEMENT (Endo Hip);  Surgeon: Alex Sesay MD;  Location: [facility];  Service: Orthopedics;  Laterality: Right;        General Precautions: aspiration       Recommendations: ST recommends Pt remain NPO. ST will attempt Dysphagia tx in hopes Pt's mental status improves. Possible repeat MBSS later if Pt is appropriate. MD and nursing edu on recommendations. MD reported he will speak to Pt's daughter and possibly insert NG tube until a decision can be made (unless Pt's mentation improves and he can tolerate PO intake safely).          Consistencies Assessed  Puree applesauce    Oral Preparation / Oral Phase  Prolonged AP propulsion w/lingual pumping noted.       Pharyngeal Phase  Pt presented severe hylolaryngeal excursion w/severe penetration in laryngeal vestibule and pyriform residue. Penetration was never cleared completely despite Pt's throat clearing (penetration gurgling in laryngeal vestibule) and  re-swallowing. Any penetration cleared from laryngeal vestibule was seen re-entering after Pt's attempt at swallow. Pt eventually aspirated the penetrated material which resulted in coughing. Pt was able to expectorate some secretions after coughing but his respiration and vocal quality remained gurgly. ST ended MBSS so Pt could get suctioned and prevent further aspiration.         Cervical Esophageal Phase:   Not assessed.      Impressions  Severe oropharyngeal Dysphagia.       Prognosis/Plan/Education:  ST recommends Pt remain NPO, utilization of Dysphagia tx, and to possibly have a repeat MBSS if Pt's mental status improves. Pt's current alert/mental status, alone, is placing Pt at risk for aspiration. ST recommends alternate methods of nutrition (IV, NG) in hopes Pt is able to wake up and better participate cognitively. MD will speak to Pt's daughter in regards to PEG. However, yesterday, Pt's daughter reported the living will refuses PEG.     Care Plan   Multidisciplinary Problems       SLP Goals          Problem: SLP    Goal Priority Disciplines Outcome   SLP Goal     SLP    Description: Pt will remain awake for at least 30 min, to safely tolerate therapeutic PO trials.     Pt will tolerate safest and least restrictive diet w/o overt s/s of aspiration or Dysphagia

## 2022-09-21 NOTE — SUBJECTIVE & OBJECTIVE
Interval History:  Patient is very weak.  He wakens of respond appropriately but has no energy to provide much information.  Family member said that he has been weak, short of breath, he is coughing.  Swallow study failed.    Review of Systems   Reason unable to perform ROS: Patient is very weak lethargic not able to provide much information.  Majority of complains of being given by family members and nurses.   Constitutional:  Positive for activity change, appetite change and fatigue. Negative for chills.   Respiratory:  Positive for cough, choking and shortness of breath.    Objective:     Vital Signs (Most Recent):  Temp: 98.2 °F (36.8 °C) (09/21/22 1231)  Pulse: 95 (09/21/22 1542)  Resp: 20 (09/21/22 1315)  BP: (!) 112/57 (09/21/22 1542)  SpO2: 99 % (09/21/22 1542)   Vital Signs (24h Range):  Temp:  [98 °F (36.7 °C)-99.6 °F (37.6 °C)] 98.2 °F (36.8 °C)  Pulse:  [] 95  Resp:  [20-28] 20  SpO2:  [92 %-100 %] 99 %  BP: (112-154)/(57-86) 112/57     Weight: 67.6 kg (149 lb)  Body mass index is 21.38 kg/m².  No intake or output data in the 24 hours ending 09/21/22 1653   Physical Exam  Vitals and nursing note reviewed.   Constitutional:       General: He is in acute distress.      Appearance: He is ill-appearing and diaphoretic.   HENT:      Mouth/Throat:      Mouth: Mucous membranes are dry.   Eyes:      Extraocular Movements: Extraocular movements intact.      Pupils: Pupils are equal, round, and reactive to light.   Cardiovascular:      Rate and Rhythm: Normal rate and regular rhythm.      Heart sounds: Murmur heard.   Pulmonary:      Effort: No respiratory distress.      Breath sounds: Wheezing, rhonchi and rales present.   Abdominal:      General: Abdomen is flat. Bowel sounds are normal.      Palpations: Abdomen is soft.   Musculoskeletal:         General: Normal range of motion.      Cervical back: Normal range of motion.      Right lower leg: Edema present.      Left lower leg: Edema present.   Skin:      Capillary Refill: Capillary refill takes less than 2 seconds.   Neurological:      Mental Status: He is disoriented.   Psychiatric:         Mood and Affect: Mood normal.       Significant Labs: All pertinent labs within the past 24 hours have been reviewed.  Recent Lab Results         09/21/22  0320        Albumin/Globulin Ratio 0.9       Albumin 2.7       Alkaline Phosphatase 75       ALT 23       AST 36       Baso # 0.12       Basophil % 1.3       BILIRUBIN TOTAL 1.9       BUN 19.0       Calcium 8.5       Chloride 106       CO2 28       Creatinine 0.98       .90       eGFR >60       Eos # 0.87       Eosinophil % 9.2       Globulin, Total 3.0       Glucose 98       Hematocrit 32.1       Hemoglobin 10.3       Immature Grans (Abs) 0.24       Immature Granulocytes 2.5       Lymph # 1.14       LYMPH % 12.1       Magnesium 1.80       MCH 32.3       MCHC 32.1       .6       Mono # 1.09       Mono % 11.6       MPV 12.4       Neut # 6.0       Neut % 63.3       Phosphorus 2.1       Platelets 121       Potassium 4.0       Prealbumin 8.1       PROTEIN TOTAL 5.7       RBC 3.19       RDW 14.0       Sodium 142       WBC 9.4               Significant Imaging: I have reviewed all pertinent imaging results/findings within the past 24 hours.

## 2022-09-21 NOTE — PLAN OF CARE
Spoke to Torrey mccray/ [facility] and he stated that they can take pt.  [facility] asked for daughter's number, gave to them.  Contacted daughter and informed her that [facility] denied her father and that [facility] or [facility] can take him,  She has a call to her  to discuss and will let me know.

## 2022-09-21 NOTE — PT/OT/SLP PROGRESS
Physical Therapy Treatment    Patient Name:  Blaze Turner   MRN:  [id number]    Recommendations:     Discharge Recommendations:  nursing facility, skilled   Discharge Equipment Recommendations:     Barriers to discharge:  medical condition    Assessment:     Blaze Turner is a 89 y.o. male admitted with a medical diagnosis of Closed fracture of right hip.  He presents with the following impairments/functional limitations:  weakness, impaired endurance, gait instability, impaired balance, pain, impaired cognition, decreased safety awareness .    Pt in bed with eyes closes, he was difficult to arouse. He was mumbling but not speaking clearly. With max A, pt go up to sitting at EOB. He kept his eyes closed, required max A to maintain balance and did not participate in ROM. He was assisted back to bed, unsafe to transfer to chair.    Rehab Prognosis: Fair; patient would benefit from acute skilled PT services to address these deficits and reach maximum level of function.    Recent Surgery: Procedure(s) (LRB):  ARTHROPLASTY, HIP REPLACEMENT (Endo Hip) (Right) 4 Days Post-Op    Plan:     During this hospitalization, patient to be seen BID to address the identified rehab impairments via gait training, therapeutic activities, therapeutic exercises and progress toward the following goals:    Plan of Care Expires:  10/17/22    Subjective     Chief Complaint: pain  Patient/Family Comments/goals: to increase I with functional mobility  Pain/Comfort:         Objective:     Communicated with patient prior to session.  Patient found HOB elevated with peripheral IV upon PT entry to room.     General Precautions: Standard, fall   Orthopedic Precautions:RLE partial weight bearing   Braces:    Respiratory Status: Room air     Functional Mobility:  Bed Mobility:     Supine to Sit: maximal assistance  Balance: max A sitting      AM-PAC 6 CLICK MOBILITY          Therapeutic Activities and Exercises:   See above    Patient left up  in chair with all lines intact and call button in reach..    GOALS:   Multidisciplinary Problems       Physical Therapy Goals          Problem: Physical Therapy    Goal Priority Disciplines Outcome Goal Variances Interventions   Physical Therapy Goal     PT, PT/OT Ongoing, Progressing     Description: Goals to be met by: 10/17/22     Patient will increase functional independence with mobility by performin. Supine to sit with MInimal Assistance  2. Sit to stand transfer with Minimal Assistance  3. Gait  x 150 feet with Contact Guard Assistance using Rolling Walker.                          Time Tracking:     PT Received On: 22  PT Start Time: 920    PT Stop Time: 940  PT Total Time (min): 20min     Billable Minutes: Therapeutic Activity 20    Treatment Type: Treatment  PT/PTA: PTA           2022

## 2022-09-21 NOTE — PLAN OF CARE
[place] is asking for COVID vaccinations status on pt.  Called daughter and left VM regarding this and my contact number for her to call me back.

## 2022-09-22 LAB
ALBUMIN SERPL-MCNC: 2.5 GM/DL (ref 3.4–4.8)
ALBUMIN/GLOB SERPL: 0.9 RATIO (ref 1.1–2)
ALP SERPL-CCNC: 66 UNIT/L (ref 40–150)
ALT SERPL-CCNC: 23 UNIT/L (ref 0–55)
AST SERPL-CCNC: 31 UNIT/L (ref 5–34)
BASOPHILS # BLD AUTO: 0.06 X10(3)/MCL (ref 0–0.2)
BASOPHILS NFR BLD AUTO: 0.6 %
BILIRUBIN DIRECT+TOT PNL SERPL-MCNC: 2.2 MG/DL
BUN SERPL-MCNC: 19 MG/DL (ref 8.4–25.7)
CALCIUM SERPL-MCNC: 8.3 MG/DL (ref 8.8–10)
CHLORIDE SERPL-SCNC: 107 MMOL/L (ref 98–111)
CO2 SERPL-SCNC: 28 MMOL/L (ref 23–31)
CREAT SERPL-MCNC: 1.05 MG/DL (ref 0.73–1.18)
CRP SERPL-MCNC: 171.4 MG/L
EOSINOPHIL # BLD AUTO: 0.9 X10(3)/MCL (ref 0–0.9)
EOSINOPHIL NFR BLD AUTO: 9.7 %
ERYTHROCYTE [DISTWIDTH] IN BLOOD BY AUTOMATED COUNT: 14.4 % (ref 11.5–17)
GFR SERPLBLD CREATININE-BSD FMLA CKD-EPI: >60 MLS/MIN/1.73/M2
GLOBULIN SER-MCNC: 2.8 GM/DL (ref 2.4–3.5)
GLUCOSE SERPL-MCNC: 104 MG/DL (ref 75–121)
HCT VFR BLD AUTO: 32 % (ref 42–52)
HGB BLD-MCNC: 10.1 GM/DL (ref 14–18)
IMM GRANULOCYTES # BLD AUTO: 0.4 X10(3)/MCL (ref 0–0.04)
IMM GRANULOCYTES NFR BLD AUTO: 4.3 %
LYMPHOCYTES # BLD AUTO: 0.74 X10(3)/MCL (ref 0.6–4.6)
LYMPHOCYTES NFR BLD AUTO: 8 %
MCH RBC QN AUTO: 33 PG (ref 27–31)
MCHC RBC AUTO-ENTMCNC: 31.6 MG/DL (ref 33–36)
MCV RBC AUTO: 104.6 FL (ref 80–94)
MONOCYTES # BLD AUTO: 1.22 X10(3)/MCL (ref 0.1–1.3)
MONOCYTES NFR BLD AUTO: 13.1 %
NEUTROPHILS # BLD AUTO: 6 X10(3)/MCL (ref 2.1–9.2)
NEUTROPHILS NFR BLD AUTO: 64.3 %
PLATELET # BLD AUTO: 143 X10(3)/MCL (ref 130–400)
PMV BLD AUTO: 12 FL (ref 7.4–10.4)
POTASSIUM SERPL-SCNC: 3.9 MMOL/L (ref 3.5–5.1)
PROT SERPL-MCNC: 5.3 GM/DL (ref 5.8–7.6)
RBC # BLD AUTO: 3.06 X10(6)/MCL (ref 4.7–6.1)
SODIUM SERPL-SCNC: 144 MMOL/L (ref 132–146)
WBC # SPEC AUTO: 9.3 X10(3)/MCL (ref 4.5–11.5)

## 2022-09-22 PROCEDURE — 11000001 HC ACUTE MED/SURG PRIVATE ROOM

## 2022-09-22 PROCEDURE — 25000003 PHARM REV CODE 250: Performed by: INTERNAL MEDICINE

## 2022-09-22 PROCEDURE — 94640 AIRWAY INHALATION TREATMENT: CPT

## 2022-09-22 PROCEDURE — 25000242 PHARM REV CODE 250 ALT 637 W/ HCPCS: Performed by: INTERNAL MEDICINE

## 2022-09-22 PROCEDURE — 25000003 PHARM REV CODE 250: Performed by: SPECIALIST

## 2022-09-22 PROCEDURE — 86140 C-REACTIVE PROTEIN: CPT | Performed by: EMERGENCY MEDICINE

## 2022-09-22 PROCEDURE — 63600175 PHARM REV CODE 636 W HCPCS: Performed by: EMERGENCY MEDICINE

## 2022-09-22 PROCEDURE — 36415 COLL VENOUS BLD VENIPUNCTURE: CPT | Performed by: EMERGENCY MEDICINE

## 2022-09-22 PROCEDURE — 31720 CLEARANCE OF AIRWAYS: CPT

## 2022-09-22 PROCEDURE — 80053 COMPREHEN METABOLIC PANEL: CPT | Performed by: EMERGENCY MEDICINE

## 2022-09-22 PROCEDURE — 25000003 PHARM REV CODE 250: Performed by: EMERGENCY MEDICINE

## 2022-09-22 PROCEDURE — 99900035 HC TECH TIME PER 15 MIN (STAT)

## 2022-09-22 PROCEDURE — 25000242 PHARM REV CODE 250 ALT 637 W/ HCPCS: Performed by: EMERGENCY MEDICINE

## 2022-09-22 PROCEDURE — S5010 5% DEXTROSE AND 0.45% SALINE: HCPCS | Performed by: EMERGENCY MEDICINE

## 2022-09-22 PROCEDURE — 63600175 PHARM REV CODE 636 W HCPCS: Performed by: INTERNAL MEDICINE

## 2022-09-22 PROCEDURE — 85025 COMPLETE CBC W/AUTO DIFF WBC: CPT | Performed by: EMERGENCY MEDICINE

## 2022-09-22 PROCEDURE — 97530 THERAPEUTIC ACTIVITIES: CPT

## 2022-09-22 PROCEDURE — 27000221 HC OXYGEN, UP TO 24 HOURS

## 2022-09-22 PROCEDURE — 92526 ORAL FUNCTION THERAPY: CPT

## 2022-09-22 PROCEDURE — 94761 N-INVAS EAR/PLS OXIMETRY MLT: CPT

## 2022-09-22 RX ORDER — HYDROCORTISONE 1 %
CREAM (GRAM) TOPICAL 2 TIMES DAILY
Status: DISCONTINUED | OUTPATIENT
Start: 2022-09-22 | End: 2022-09-28 | Stop reason: HOSPADM

## 2022-09-22 RX ORDER — DEXTROSE MONOHYDRATE AND SODIUM CHLORIDE 5; .45 G/100ML; G/100ML
INJECTION, SOLUTION INTRAVENOUS CONTINUOUS
Status: DISCONTINUED | OUTPATIENT
Start: 2022-09-22 | End: 2022-09-28 | Stop reason: HOSPADM

## 2022-09-22 RX ORDER — IPRATROPIUM BROMIDE AND ALBUTEROL SULFATE 2.5; .5 MG/3ML; MG/3ML
3 SOLUTION RESPIRATORY (INHALATION) EVERY 6 HOURS PRN
Status: DISCONTINUED | OUTPATIENT
Start: 2022-09-22 | End: 2022-09-28 | Stop reason: HOSPADM

## 2022-09-22 RX ADMIN — PIPERACILLIN SODIUM AND TAZOBACTAM SODIUM 4.5 G: 4; 500 INJECTION, POWDER, FOR SOLUTION INTRAVENOUS at 11:09

## 2022-09-22 RX ADMIN — IPRATROPIUM BROMIDE AND ALBUTEROL SULFATE 3 ML: 2.5; .5 SOLUTION RESPIRATORY (INHALATION) at 01:09

## 2022-09-22 RX ADMIN — FAMOTIDINE 20 MG: 10 INJECTION, SOLUTION INTRAVENOUS at 09:09

## 2022-09-22 RX ADMIN — DEXTROSE AND SODIUM CHLORIDE: 5; 450 INJECTION, SOLUTION INTRAVENOUS at 01:09

## 2022-09-22 RX ADMIN — ENOXAPARIN SODIUM 30 MG: 30 INJECTION SUBCUTANEOUS at 06:09

## 2022-09-22 RX ADMIN — PIPERACILLIN SODIUM AND TAZOBACTAM SODIUM 4.5 G: 4; 500 INJECTION, POWDER, FOR SOLUTION INTRAVENOUS at 06:09

## 2022-09-22 RX ADMIN — HYDROCORTISONE: 1 CREAM TOPICAL at 09:09

## 2022-09-22 RX ADMIN — ACETAMINOPHEN 325 MG: 325 SUPPOSITORY RECTAL at 04:09

## 2022-09-22 RX ADMIN — IPRATROPIUM BROMIDE AND ALBUTEROL SULFATE 3 ML: 2.5; .5 SOLUTION RESPIRATORY (INHALATION) at 07:09

## 2022-09-22 RX ADMIN — FUROSEMIDE 20 MG: 10 INJECTION INTRAMUSCULAR; INTRAVENOUS at 09:09

## 2022-09-22 RX ADMIN — IPRATROPIUM BROMIDE AND ALBUTEROL SULFATE 3 ML: 2.5; .5 SOLUTION RESPIRATORY (INHALATION) at 08:09

## 2022-09-22 RX ADMIN — PIPERACILLIN SODIUM AND TAZOBACTAM SODIUM 4.5 G: 4; 500 INJECTION, POWDER, FOR SOLUTION INTRAVENOUS at 02:09

## 2022-09-22 RX ADMIN — MUPIROCIN: 20 OINTMENT TOPICAL at 09:09

## 2022-09-22 NOTE — PROGRESS NOTES
[facility] - [facility] Surgical Unit  Wound Care    Patient Name:  Blaze Turner   MRN:  [id number]  Date: 9/22/2022  Diagnosis: Closed fracture of right hip    History:     Past Medical History:   Diagnosis Date    Trigeminal neuralgia        Social History     Socioeconomic History    Marital status:    Tobacco Use    Smoking status: Never    Smokeless tobacco: Never   Substance and Sexual Activity    Alcohol use: Never    Drug use: Never       Precautions:     Allergies as of 09/16/2022    (No Known Allergies)       WOC Assessment Details/Treatment     Patient non verbal, son in law at bedside.  Patient underwent a right  hip unipolar arthroplasty 9/17/22.       Right Hip   09/22/22 1700   WOCN Assessment   WOCN Total Time (mins) 45   Visit Date 09/22/22   Visit Time 1630   Consult Type New   WOCN Speciality Wound   Wound surgical   Number of Wounds 1   Positioning   Head of Bed (HOB) Positioning HOB at 30 degrees   Positioning/Transfer Devices pillows        Incision/Site 09/17/22 1016 Right Hip   Date First Assessed/Time First Assessed: 09/17/22 1016   Side: Right  Location: Hip   Dressing Appearance Intact   Drainage Amount Small   Drainage Characteristics/Odor Serous;Clear   Appearance Staples intact  (incision line well approximated with healing ridge noted, 18 staples in place)   Wound Edges Defined   Wound Length (cm) 15 cm   Wound Width (cm) 0.2 cm   Wound Depth (cm) 0.2 cm   Wound Volume (cm^3) 0.6 cm^3   Wound Surface Area (cm^2) 3 cm^2   Dressing Applied  (silver alginate, island dressing)    (Insert flowsheet data here)    Recommendations made to primary team for dressing changes  . Orders placed.     09/22/2022

## 2022-09-22 NOTE — PROGRESS NOTES
Speech Language Pathology  Treatment    Blaze Turner   MRN: [id number]   Admitting Diagnosis: Closed fracture of right hip    Diet recommendations: Solid Diet Level: NPO  Liquid Diet Level: NPO       SLP Treatment Date: (P) 09/22/22  Speech Start Time: (P) 1350     Speech Stop Time: (P) 1445     Speech Total (min): (P) 55 min       TREATMENT BILLABLE MINUTES:  Treatment Swallowing Dysfunction 55    Has the patient been evaluated by SLP for swallowing? : Yes  Keep patient NPO?: Yes   General Precautions: Standard, aspiration          Subjective:  Pt was initially alert, woke up while ST and son-in-law talked, then started to fall asleep during PO trials.          Objective:    Skilled intervention focused on utilization of therapeutic PO trials.       Assessment:  Blaze Turner is a 89 y.o. male with a medical diagnosis of Closed fracture of right hip and presents with severe Dysphagia. Pt's son-in-law reported they are still unsure what decisions to make in regards to PO feedings/PEG/SNF/Hospice. He reported they could take him home (on hospice), feed him and he aspirate, don't feed him and starve, or put a PEG that is against Pt's wishes. Pt's son-in-law was unsure what to do and reported the staff is not giving him answers. ST edu that we can not tell Pt what to do and it is the family's decision.   ST provided x5 trials of ice chips. Pt was inconsistent in his response. X2 Pt did not event trigger a swallow, x3 Pt triggered a swallow, Pt remained w/gurgly vocal quality, and coughed x2.     ST edu son-in-law that Pt is still unsafe for PO intake. Pt's son-in-law was able to visualize MBSS yesterday and agreed  b/c he was able to see what was happening. ST will re-assess Pt's status tomorrow prior to making a decision on a repeat MBSS. Pt can not remain awake long enough to participate in MBSS.     Discharge recommendations: Unknown currently    Goals:   Multidisciplinary Problems       SLP Goals           Problem: SLP    Goal Priority Disciplines Outcome   SLP Goal     SLP    Description: Pt will remain awake for at least 30 min, to safely tolerate therapeutic PO trials.     Pt will tolerate safest and least restrictive diet w/o overt s/s of aspiration or Dysphagia                            Plan:   Patient to be seen Therapy Frequency: 3 x/week (M-F)   Plan of Care reviewed with:  Nursing and son-in-law  SLP Follow-up?: Yes  SLP - Next Visit Date: 09/23/22 09/22/2022

## 2022-09-22 NOTE — SUBJECTIVE & OBJECTIVE
Interval History:  Patient unable to provide much information.  More awake and alert follows few commands.  Family wants know if another swallow study can be done so we can move with some type of feeding.    Review of Systems   Reason unable to perform ROS: Patient unable to provide much information secondary to advanced dementia.   Objective:     Vital Signs (Most Recent):  Temp: 98.6 °F (37 °C) (09/22/22 1112)  Pulse: (!) 130 (09/22/22 1112)  Resp: 20 (09/22/22 1031)  BP: 123/78 (09/22/22 1112)  SpO2: (!) 76 % (09/22/22 1112)   Vital Signs (24h Range):  Temp:  [98.2 °F (36.8 °C)-98.6 °F (37 °C)] 98.6 °F (37 °C)  Pulse:  [] 130  Resp:  [20-22] 20  SpO2:  [76 %-100 %] 76 %  BP: (112-181)/(57-87) 123/78     Weight: 67.6 kg (149 lb)  Body mass index is 21.38 kg/m².  No intake or output data in the 24 hours ending 09/22/22 1314   Physical Exam  Vitals and nursing note reviewed.   Constitutional:       General: He is not in acute distress.     Appearance: He is ill-appearing. He is not toxic-appearing or diaphoretic.      Comments: Anxious.   HENT:      Head: Normocephalic.   Eyes:      Extraocular Movements: Extraocular movements intact.      Pupils: Pupils are equal, round, and reactive to light.   Cardiovascular:      Rate and Rhythm: Tachycardia present. Rhythm irregular.   Pulmonary:      Breath sounds: Rhonchi and rales present.   Abdominal:      General: Bowel sounds are normal. There is no distension.      Palpations: Abdomen is soft.   Musculoskeletal:         General: Normal range of motion.      Cervical back: Normal range of motion.      Comments: Loss of muscle mass.   Skin:     Capillary Refill: Capillary refill takes less than 2 seconds.   Neurological:      Mental Status: He is alert.   Psychiatric:      Comments: Anxious, disoriented.       Significant Labs: All pertinent labs within the past 24 hours have been reviewed.  Recent Lab Results         09/22/22  0353   09/22/22  0352   09/21/22  8668         Albumin/Globulin Ratio 0.9           Albumin 2.5           Alkaline Phosphatase 66           ALT 23           AST 31           Baso #   0.06         Basophil %   0.6         BILIRUBIN TOTAL 2.2           BNP     436.5       BUN 19.0           Calcium 8.3           Chloride 107           CO2 28           Creatinine 1.05           .40           eGFR >60           Eos #   0.90         Eosinophil %   9.7         Globulin, Total 2.8           Glucose 104           Hematocrit   32.0         Hemoglobin   10.1         Immature Grans (Abs)   0.40         Immature Granulocytes   4.3         Lymph #   0.74         LYMPH %   8.0         MCH   33.0         MCHC   31.6         MCV   104.6         Mono #   1.22         Mono %   13.1         MPV   12.0         Neut #   6.0         Neut %   64.3         Platelets   143         Potassium 3.9           PROTEIN TOTAL 5.3           RBC   3.06         RDW   14.4         Sodium 144           WBC   9.3                 Significant Imaging: I have reviewed all pertinent imaging results/findings within the past 24 hours.

## 2022-09-22 NOTE — PROGRESS NOTES
[facility] Medicine  Progress Note    Patient Name: Blaze Turner  MRN: [id number]  Patient Class: IP- Inpatient   Admission Date: 9/16/2022  Length of Stay: 6 days  Attending Physician: Gus Hewitt MD  Primary Care Provider: Gaudencio Munguia MD        Subjective:     Principal Problem:Closed fracture of right hip        HPI:  89-year-old male with hx Dementia,  HTN, Trigeminal neuralgia, Afib, CKD stage 3 baseline Cr 1.5, brought in by his son after fall at home patient states he could not bear weight he was complaining of significant right hip pain so the son brought him to the emergency department.  No LOC, no chest pain, no F/C, no chest pain per daughter. PT takesTrileptal 150 mg daily per daughter. Pt has developed rash for past 5 days per daughter who stays with him. Pt was scratching and agitated he was give Ativan in Ed, now sleeping. Daughter at bedside. Pt is DNR per daughter          Overview/Hospital Course:    9/18/22  Pt has remained lethargic and unarousable since surgery yesterday. Seem to attempt to wake up and mumbles with rough stimulation. Labs were ordered but not drawn this morning. Labs reordered and currently pending.    9/19/22  Upon entering room, son was holding emesis basin for pt as he was sitting up reclined leaning over to right with audible gurgling and spitting up a bit; no real vomitus. He was also coughing as well but not forceful enough to be effective. Son states pt has been awake and alert this morning. He ate a little breakfast and had just eaten some muffin f/b water. Son states RT then came in and had pt use IS and that's when he thinks he sucked in some food and began gagging and coughing. RT states pt had gurgling noises before IS attempted and wasn't able to perform d/t incoordination. RT then deep suctioned using Yonkuer and pt cleared.    09/20/2022.   patient can be somnolent.  Family stated that episodes of emesis  at home and questionable choking with liquids.  This has been happened several times at home   Continue to monitor patient closely  Will have speech to evaluate to deter will be the safest food take by mouth.  Discussed case with family members and eventually they are not recommending any PEG tube placement at this time   Will work with  toward a skilled nursing facility treatment.  09/21/2022.    Patient continues to be somnolent.    Speech evaluation persistent with aspirations not good swelling.  Chest x-ray confirms patient aspirates   Keep patient NPO   Hold IV fluid since patient has been on Lasix in the past questionable history of congestion CHF  Will check proBNP in the morning repeat chest x-ray   Start patient on Zosyn for aspiration pneumonia.    Discussed case with family and .  Most likely this will be an LTAC case.  Patient is do not resuscitate code status her patient's and family member.    02/20/2022.    Patient more awake alert today.  Follows few commands.  Still anxious and confused.    Discussed case with family members.    Will start patient on low-dose IV fluid.  Follow up with speech.    Regardless of outcome family members do not want patient to have a PEG.          Interval History:  Patient unable to provide much information.  More awake and alert follows few commands.  Family wants know if another swallow study can be done so we can move with some type of feeding.    Review of Systems   Reason unable to perform ROS: Patient unable to provide much information secondary to advanced dementia.   Objective:     Vital Signs (Most Recent):  Temp: 98.6 °F (37 °C) (09/22/22 1112)  Pulse: (!) 130 (09/22/22 1112)  Resp: 20 (09/22/22 1031)  BP: 123/78 (09/22/22 1112)  SpO2: (!) 76 % (09/22/22 1112)   Vital Signs (24h Range):  Temp:  [98.2 °F (36.8 °C)-98.6 °F (37 °C)] 98.6 °F (37 °C)  Pulse:  [] 130  Resp:  [20-22] 20  SpO2:  [76 %-100 %] 76 %  BP: (112-181)/(57-87)  123/78     Weight: 67.6 kg (149 lb)  Body mass index is 21.38 kg/m².  No intake or output data in the 24 hours ending 09/22/22 1314   Physical Exam  Vitals and nursing note reviewed.   Constitutional:       General: He is not in acute distress.     Appearance: He is ill-appearing. He is not toxic-appearing or diaphoretic.      Comments: Anxious.   HENT:      Head: Normocephalic.   Eyes:      Extraocular Movements: Extraocular movements intact.      Pupils: Pupils are equal, round, and reactive to light.   Cardiovascular:      Rate and Rhythm: Tachycardia present. Rhythm irregular.   Pulmonary:      Breath sounds: Rhonchi and rales present.   Abdominal:      General: Bowel sounds are normal. There is no distension.      Palpations: Abdomen is soft.   Musculoskeletal:         General: Normal range of motion.      Cervical back: Normal range of motion.      Comments: Loss of muscle mass.   Skin:     Capillary Refill: Capillary refill takes less than 2 seconds.   Neurological:      Mental Status: He is alert.   Psychiatric:      Comments: Anxious, disoriented.       Significant Labs: All pertinent labs within the past 24 hours have been reviewed.  Recent Lab Results         09/22/22  0353   09/22/22  0352   09/21/22  1659        Albumin/Globulin Ratio 0.9           Albumin 2.5           Alkaline Phosphatase 66           ALT 23           AST 31           Baso #   0.06         Basophil %   0.6         BILIRUBIN TOTAL 2.2           BNP     436.5       BUN 19.0           Calcium 8.3           Chloride 107           CO2 28           Creatinine 1.05           .40           eGFR >60           Eos #   0.90         Eosinophil %   9.7         Globulin, Total 2.8           Glucose 104           Hematocrit   32.0         Hemoglobin   10.1         Immature Grans (Abs)   0.40         Immature Granulocytes   4.3         Lymph #   0.74         LYMPH %   8.0         MCH   33.0         MCHC   31.6         MCV   104.6         Mono  #   1.22         Mono %   13.1         MPV   12.0         Neut #   6.0         Neut %   64.3         Platelets   143         Potassium 3.9           PROTEIN TOTAL 5.3           RBC   3.06         RDW   14.4         Sodium 144           WBC   9.3                 Significant Imaging: I have reviewed all pertinent imaging results/findings within the past 24 hours.      Assessment/Plan:      * Closed fracture of right hip  Continue with postop order.  Follow up with PT OT.      Aspiration pneumonitis  Stable.    Repeat chest x-ray in the morning continue Zosyn        Encephalopathy, metabolic  This is a combination of patient's baseline and infection.      Chronic pulmonary aspiration  Currently with aspiration pneumonitis/pneumonia      Paroxysmal A-fib  Will use Cardizem IV push as needed for elevated heart rate more than 110.      DNR (do not resuscitate)  Patient and family have wished to make as no other their wishes are for to do not resuscitate patient.  Patient agrees with decision      Malnutrition of moderate degree  Nutrition consulted. Most recent weight and BMI monitored-                         Measurements:  Wt Readings from Last 1 Encounters:   09/16/22 67.6 kg (149 lb)   Body mass index is 21.38 kg/m².    Recommendations:      Patient has been screened and assessed by RD. RD will follow patient.      Dementia  Continue supportive care      Rash  Likely due to meds, ? Trileptal, may apply cortisol cream PRN      VTE Risk Mitigation (From admission, onward)         Ordered     enoxaparin injection 30 mg  Daily         09/16/22 2307     IP VTE HIGH RISK PATIENT  Once         09/16/22 2307     Place EUGENIA hose  Until discontinued         09/16/22 2307                Discharge Planning   LEON:      Code Status: DNR   Is the patient medically ready for discharge?:     Reason for patient still in hospital (select all that apply): Treatment             Discussed case with family member.    Would like to treat  patient for aspiration pneumonia.  Continue PT OT speech evaluation.    Chest x-ray in the morning.    I think patient not stable to go to sniff at this point.  Need to improve more breathing   Address p.o. intake prior to being discharged to skilled nursing.        Alok Murrieta MD  Department of Hospital Medicine   [facility]

## 2022-09-22 NOTE — PT/OT/SLP PROGRESS
Physical Therapy Treatment    Patient Name:  Blaze Turner   MRN:  [id number]    Recommendations:     Discharge Recommendations:  nursing facility, skilled   Discharge Equipment Recommendations:     Barriers to discharge:  medical condition    Assessment:     Blaze Turner is a 89 y.o. male admitted with a medical diagnosis of Closed fracture of right hip.  He presents with the following impairments/functional limitations:  weakness, impaired endurance, gait instability, impaired balance, pain, impaired cognition, decreased safety awareness .    Pt up in chair, alert but confused. He was able to stand with max A to RW. He took a few steps forward, about 5'. He required assistance to maintain balance and manage AD, pt with posterior lean. Attempted to ambulate again however pt could not safely follow commands. He stood to walker a few more times before transferring back to bed with max A stand pivot. Pt fatigued quickly but overall is showing improvements with mobility tolerance.     Rehab Prognosis: Fair; patient would benefit from acute skilled PT services to address these deficits and reach maximum level of function.    Recent Surgery: Procedure(s) (LRB):  ARTHROPLASTY, HIP REPLACEMENT (Endo Hip) (Right) 5 Days Post-Op    Plan:     During this hospitalization, patient to be seen BID to address the identified rehab impairments via gait training, therapeutic activities, therapeutic exercises and progress toward the following goals:    Plan of Care Expires:  10/17/22    Subjective     Chief Complaint: pain  Patient/Family Comments/goals: to increase I with functional mobility  Pain/Comfort:         Objective:     Communicated with patient prior to session.  Patient found HOB elevated with peripheral IV upon PT entry to room.     General Precautions: Standard, fall   Orthopedic Precautions:RLE partial weight bearing   Braces:    Respiratory Status: Room air     Functional Mobility:  Bed Mobility:     Supine to  Sit: maximal assistance  Transfers:     Sit to Stand:  maximal assistance with rolling walker  Bed to Chair: maximal assistance with  no AD  using  Stand Pivot  Gait: 5' max A with RW, PWB RLE  Balance: CGA in sitting, max A in standing      AM-PAC 6 CLICK MOBILITY          Therapeutic Activities and Exercises:   See above    Patient left up in chair with all lines intact and call button in reach..    GOALS:   Multidisciplinary Problems       Physical Therapy Goals          Problem: Physical Therapy    Goal Priority Disciplines Outcome Goal Variances Interventions   Physical Therapy Goal     PT, PT/OT Ongoing, Progressing     Description: Goals to be met by: 10/17/22     Patient will increase functional independence with mobility by performin. Supine to sit with MInimal Assistance  2. Sit to stand transfer with Minimal Assistance  3. Gait  x 150 feet with Contact Guard Assistance using Rolling Walker.                          Time Tracking:     PT Received On: 22  PT Start Time: 1345    PT Stop Time: 1405  PT Total Time (min): 20min     Billable Minutes: Therapeutic Activity 20    Treatment Type: Treatment  PT/PTA: PTA           2022

## 2022-09-22 NOTE — PT/OT/SLP PROGRESS
Physical Therapy Treatment    Patient Name:  Blaze Turner   MRN:  [id number]    Recommendations:     Discharge Recommendations:  nursing facility, skilled   Discharge Equipment Recommendations:     Barriers to discharge:  medical condition    Assessment:     Blaze Turner is a 89 y.o. male admitted with a medical diagnosis of Closed fracture of right hip.  He presents with the following impairments/functional limitations:  weakness, impaired endurance, gait instability, impaired balance, pain, impaired cognition, decreased safety awareness .    Pt asleep but easily aroused. He was confused and unable to follow directions this morning. Pt got up to sitting at EOB with max A. He required max A to maintain balance in sitting. Attempted exercises however pt resistant mostly. He was assisted back to bed at end of session. Pt unsafe to assist to chair.    Rehab Prognosis: Fair; patient would benefit from acute skilled PT services to address these deficits and reach maximum level of function.    Recent Surgery: Procedure(s) (LRB):  ARTHROPLASTY, HIP REPLACEMENT (Endo Hip) (Right) 5 Days Post-Op    Plan:     During this hospitalization, patient to be seen BID to address the identified rehab impairments via gait training, therapeutic activities, therapeutic exercises and progress toward the following goals:    Plan of Care Expires:  10/17/22    Subjective     Chief Complaint: pain  Patient/Family Comments/goals: to increase I with functional mobility  Pain/Comfort:         Objective:     Communicated with patient prior to session.  Patient found HOB elevated with peripheral IV upon PT entry to room.     General Precautions: Standard, fall   Orthopedic Precautions:RLE partial weight bearing   Braces:    Respiratory Status: Room air     Functional Mobility:  Bed Mobility:     Supine to Sit: maximal assistance  Balance: max A in sitting      AM-PAC 6 CLICK MOBILITY          Therapeutic Activities and Exercises:   See  above    Patient left up in chair with all lines intact and call button in reach..    GOALS:   Multidisciplinary Problems       Physical Therapy Goals          Problem: Physical Therapy    Goal Priority Disciplines Outcome Goal Variances Interventions   Physical Therapy Goal     PT, PT/OT Ongoing, Progressing     Description: Goals to be met by: 10/17/22     Patient will increase functional independence with mobility by performin. Supine to sit with MInimal Assistance  2. Sit to stand transfer with Minimal Assistance  3. Gait  x 150 feet with Contact Guard Assistance using Rolling Walker.                          Time Tracking:     PT Received On: 22  PT Start Time: 935    PT Stop Time: 955  PT Total Time (min): 20min     Billable Minutes: Therapeutic Activity 20    Treatment Type: Treatment  PT/PTA: PTA           2022

## 2022-09-23 PROCEDURE — 27000221 HC OXYGEN, UP TO 24 HOURS

## 2022-09-23 PROCEDURE — S5010 5% DEXTROSE AND 0.45% SALINE: HCPCS | Performed by: EMERGENCY MEDICINE

## 2022-09-23 PROCEDURE — 92526 ORAL FUNCTION THERAPY: CPT

## 2022-09-23 PROCEDURE — 99900035 HC TECH TIME PER 15 MIN (STAT)

## 2022-09-23 PROCEDURE — 94761 N-INVAS EAR/PLS OXIMETRY MLT: CPT

## 2022-09-23 PROCEDURE — 25000003 PHARM REV CODE 250: Performed by: INTERNAL MEDICINE

## 2022-09-23 PROCEDURE — 63600175 PHARM REV CODE 636 W HCPCS: Performed by: EMERGENCY MEDICINE

## 2022-09-23 PROCEDURE — 25000003 PHARM REV CODE 250: Performed by: SPECIALIST

## 2022-09-23 PROCEDURE — 25000003 PHARM REV CODE 250: Performed by: EMERGENCY MEDICINE

## 2022-09-23 PROCEDURE — 11000001 HC ACUTE MED/SURG PRIVATE ROOM

## 2022-09-23 PROCEDURE — 63600175 PHARM REV CODE 636 W HCPCS: Performed by: INTERNAL MEDICINE

## 2022-09-23 RX ADMIN — ACETAMINOPHEN 325 MG: 325 SUPPOSITORY RECTAL at 10:09

## 2022-09-23 RX ADMIN — DEXTROSE AND SODIUM CHLORIDE: 5; 450 INJECTION, SOLUTION INTRAVENOUS at 01:09

## 2022-09-23 RX ADMIN — PIPERACILLIN SODIUM AND TAZOBACTAM SODIUM 4.5 G: 4; 500 INJECTION, POWDER, FOR SOLUTION INTRAVENOUS at 05:09

## 2022-09-23 RX ADMIN — HYDROCORTISONE: 1 CREAM TOPICAL at 09:09

## 2022-09-23 RX ADMIN — ENOXAPARIN SODIUM 30 MG: 30 INJECTION SUBCUTANEOUS at 05:09

## 2022-09-23 RX ADMIN — ACETAMINOPHEN 325 MG: 325 SUPPOSITORY RECTAL at 03:09

## 2022-09-23 RX ADMIN — PIPERACILLIN SODIUM AND TAZOBACTAM SODIUM 4.5 G: 4; 500 INJECTION, POWDER, FOR SOLUTION INTRAVENOUS at 01:09

## 2022-09-23 RX ADMIN — PIPERACILLIN SODIUM AND TAZOBACTAM SODIUM 4.5 G: 4; 500 INJECTION, POWDER, FOR SOLUTION INTRAVENOUS at 03:09

## 2022-09-23 RX ADMIN — FAMOTIDINE 20 MG: 10 INJECTION, SOLUTION INTRAVENOUS at 09:09

## 2022-09-23 NOTE — PROGRESS NOTES
Speech Language Pathology  Treatment    Blaze Turner   MRN: [id number]   Admitting Diagnosis: Closed fracture of right hip    Diet recommendations: Solid Diet Level: (P) NPO  Liquid Diet Level: (P) NPO       SLP Treatment Date: (P) 09/23/22  Speech Start Time: (P) 1320     Speech Stop Time: (P) 1420     Speech Total (min): (P) 60 min       TREATMENT BILLABLE MINUTES:  Treatment Swallowing Dysfunction 60    Has the patient been evaluated by SLP for swallowing? : Yes  Keep patient NPO?: Yes   General Precautions: Standard, aspiration          Subjective:  Pt presented inconsistent alertness. Pt's son-in-law and dietitian were present.       Objective:    Skilled intervention focused on utilization of therapeutic PO trials while assessing Pt's mental status and safety w/PO intake.        Assessment:  Blaze Turner is a 89 y.o. male with a medical diagnosis of Closed fracture of right hip and presents with Dysphagia.  Pt's respiration and voice (when he woke up) was clear prior to PO trials. ST had to provide max cues for Pt to remain awake. ST provided pleasure feeding trials of ice chips x10. Pt initially presented delayed swallow trigger. After swallow,Pt presented mod coughing and severe gurgly vocal quality. Pt was doing better in following directions regarding coughing. ST had to assist in suctioning secretions he coughed up. Pt exhibiting increased productive coughs, via verbal direction, better than previous days. Pt's voice would clear after a productive cough and suctioning, until ice chip trials were consumed.   Pt is still not fully awake for PO intake and is still presenting overt s/s of aspiration/Dysphagia. Pt's son asked about thickened liquids. ST edu that thickened liquids or pureed foods are harder to cough and expell from airway. Water is thinner, it is easier to cough, and the neutral PH does not harm the lungs. Pt's son-in-law verbalized agreement.   Nursing edu on tx session and ST  recommendation of Pt remaining NPO.    Discharge recommendations: Discharge Facility/Level of Care Needs:  (Unknowns)     Goals:   Multidisciplinary Problems       SLP Goals          Problem: SLP    Goal Priority Disciplines Outcome   SLP Goal     SLP    Description: Pt will remain awake for at least 30 min, to safely tolerate therapeutic PO trials.     Pt will tolerate safest and least restrictive diet w/o overt s/s of aspiration or Dysphagia                            Plan:   Patient to be seen Therapy Frequency: (P) 3 x/week   Plan of Care reviewed with:  Son-in-law, Dietitian,and nursing  SLP Follow-up?: (P) Yes  SLP - Next Visit Date: (P) 09/26/22 09/23/2022

## 2022-09-23 NOTE — PROGRESS NOTES
[facility] Medicine  Progress Note    Patient Name: Blaze Turner  MRN: [id number]  Patient Class: IP- Inpatient   Admission Date: 9/16/2022  Length of Stay: 7 days  Attending Physician: Gus Hewitt MD  Primary Care Provider: Gaudencio Munguia MD        Subjective:     Principal Problem:Closed fracture of right hip        HPI:  89-year-old male with hx Dementia,  HTN, Trigeminal neuralgia, Afib, CKD stage 3 baseline Cr 1.5, brought in by his son after fall at home patient states he could not bear weight he was complaining of significant right hip pain so the son brought him to the emergency department.  No LOC, no chest pain, no F/C, no chest pain per daughter. PT takesTrileptal 150 mg daily per daughter. Pt has developed rash for past 5 days per daughter who stays with him. Pt was scratching and agitated he was give Ativan in Ed, now sleeping. Daughter at bedside. Pt is DNR per daughter          Overview/Hospital Course:    9/18/22  Pt has remained lethargic and unarousable since surgery yesterday. Seem to attempt to wake up and mumbles with rough stimulation. Labs were ordered but not drawn this morning. Labs reordered and currently pending.    9/19/22  Upon entering room, son was holding emesis basin for pt as he was sitting up reclined leaning over to right with audible gurgling and spitting up a bit; no real vomitus. He was also coughing as well but not forceful enough to be effective. Son states pt has been awake and alert this morning. He ate a little breakfast and had just eaten some muffin f/b water. Son states RT then came in and had pt use IS and that's when he thinks he sucked in some food and began gagging and coughing. RT states pt had gurgling noises before IS attempted and wasn't able to perform d/t incoordination. RT then deep suctioned using Yonkuer and pt cleared.    09/20/2022.   patient can be somnolent.  Family stated that episodes of emesis  at home and questionable choking with liquids.  This has been happened several times at home   Continue to monitor patient closely  Will have speech to evaluate to deter will be the safest food take by mouth.  Discussed case with family members and eventually they are not recommending any PEG tube placement at this time   Will work with  toward a skilled nursing facility treatment.  09/21/2022.    Patient continues to be somnolent.    Speech evaluation persistent with aspirations not good swelling.  Chest x-ray confirms patient aspirates   Keep patient NPO   Hold IV fluid since patient has been on Lasix in the past questionable history of congestion CHF  Will check proBNP in the morning repeat chest x-ray   Start patient on Zosyn for aspiration pneumonia.    Discussed case with family and .  Most likely this will be an LTAC case.  Patient is do not resuscitate code status her patient's and family member.    02/22/2022.    Patient more awake alert today.  Follows few commands.  Still anxious and confused.    Discussed case with family members.    Will start patient on low-dose IV fluid.  Follow up with speech.    Regardless of outcome family members do not want patient to have a PEG.    09/23/2022.    No significant changes compared to yesterday.    Discussed with family plan.  Since no need for PEG placement per patient's request we were not going to have a swallow study done and modified barium.    will try to treat patient with thickened liquids as tolerated.  Family are willing to consider alternative medical treatment including comfort measures.  Will continue IV antibiotics   continue nebulizer treatment  Follow-up the next 24-48 hours.      Interval History:  Family stated patient has some cough yesterday.  Minimal discomfort.  Slept very well but during the night was awake.        Review of Systems   Reason unable to perform ROS: Patient unable to provide information secondary to  advanced dementia.   Objective:     Vital Signs (Most Recent):  Temp: 97.5 °F (36.4 °C) (09/23/22 1121)  Pulse: 87 (09/23/22 1121)  Resp: 18 (09/22/22 2048)  BP: 131/80 (09/23/22 1121)  SpO2: 96 % (09/23/22 1121) Vital Signs (24h Range):  Temp:  [97.5 °F (36.4 °C)-101.5 °F (38.6 °C)] 97.5 °F (36.4 °C)  Pulse:  [] 87  Resp:  [18] 18  SpO2:  [94 %-100 %] 96 %  BP: (126-153)/(69-80) 131/80     Weight: 67.6 kg (149 lb)  Body mass index is 21.38 kg/m².  No intake or output data in the 24 hours ending 09/23/22 1419   Physical Exam  Constitutional:       General: He is not in acute distress.     Appearance: He is ill-appearing. He is not toxic-appearing or diaphoretic.   HENT:      Head: Normocephalic.      Mouth/Throat:      Mouth: Mucous membranes are dry.   Eyes:      Extraocular Movements: Extraocular movements intact.      Pupils: Pupils are equal, round, and reactive to light.   Cardiovascular:      Rate and Rhythm: Normal rate and regular rhythm.      Heart sounds: Murmur heard.   Musculoskeletal:      Cervical back: Normal range of motion.      Comments: Surgical site appeared to be intact.  Pain with movement   Neurological:      Mental Status: He is disoriented.       Significant Labs: All pertinent labs within the past 24 hours have been reviewed.  Recent Lab Results       None            Significant Imaging: I have reviewed all pertinent imaging results/findings within the past 24 hours.      Assessment/Plan:      * Closed fracture of right hip  Continue with postop order.  Follow up with PT OT.      Aspiration pneumonitis  Continue treatment.  Repeat chest x-ray in the morning continue Zosyn        Encephalopathy, metabolic  This is a combination of patient's baseline and infection.      Chronic pulmonary aspiration  For family peg tube not an option since patient's wishes are not to be fed artificially  Since no need for PEG will discontinue modified barium sella  will discuss with nutritionist to find  the safest possible p.o. intake.    will follow up with family and patient      Paroxysmal A-fib  Will use Cardizem IV push as needed for elevated heart rate more than 110.      DNR (do not resuscitate)  Patient and family have wished to make as no other their wishes are for to do not resuscitate patient.  Patient agrees with decision      Malnutrition of moderate degree  Nutrition consulted. Most recent weight and BMI monitored-                         Measurements:  Wt Readings from Last 1 Encounters:   09/16/22 67.6 kg (149 lb)   Body mass index is 21.38 kg/m².    Recommendations:      Patient has been screened and assessed by RD. RD will follow patient.      Dementia  Continue supportive care      Rash  Likely due to meds, ? Trileptal, may apply cortisol cream PRN      VTE Risk Mitigation (From admission, onward)         Ordered     enoxaparin injection 30 mg  Daily         09/16/22 2307     IP VTE HIGH RISK PATIENT  Once         09/16/22 2307     Place EUGENIA hose  Until discontinued         09/16/22 2307                Discharge Planning   LEON:      Code Status: DNR   Is the patient medically ready for discharge?:     Reason for patient still in hospital (select all that apply): Patient unstable and Treatment                     Alok Murrieta MD  Department of Hospital Medicine   [facility]

## 2022-09-23 NOTE — ASSESSMENT & PLAN NOTE
For family peg tube not an option since patient's wishes are not to be fed artificially  Since no need for PEG will discontinue modified barium gregory  will discuss with nutritionist to find the safest possible p.o. intake.    will follow up with family and patient

## 2022-09-23 NOTE — PT/OT/SLP PROGRESS
Physical Therapy      Patient Name:  Blaze Turner   MRN:  [id number]    Patient not seen today secondary to Other (Comment), Patient fatigue. Family requested all therapy sessions be held today due to extreme fatigue. Will follow-up 9/24/22.

## 2022-09-23 NOTE — SUBJECTIVE & OBJECTIVE
Interval History:  Family stated patient has some cough yesterday.  Minimal discomfort.  Slept very well but during the night was awake.        Review of Systems   Reason unable to perform ROS: Patient unable to provide information secondary to advanced dementia.   Objective:     Vital Signs (Most Recent):  Temp: 97.5 °F (36.4 °C) (09/23/22 1121)  Pulse: 87 (09/23/22 1121)  Resp: 18 (09/22/22 2048)  BP: 131/80 (09/23/22 1121)  SpO2: 96 % (09/23/22 1121) Vital Signs (24h Range):  Temp:  [97.5 °F (36.4 °C)-101.5 °F (38.6 °C)] 97.5 °F (36.4 °C)  Pulse:  [] 87  Resp:  [18] 18  SpO2:  [94 %-100 %] 96 %  BP: (126-153)/(69-80) 131/80     Weight: 67.6 kg (149 lb)  Body mass index is 21.38 kg/m².  No intake or output data in the 24 hours ending 09/23/22 1419   Physical Exam  Constitutional:       General: He is not in acute distress.     Appearance: He is ill-appearing. He is not toxic-appearing or diaphoretic.   HENT:      Head: Normocephalic.      Mouth/Throat:      Mouth: Mucous membranes are dry.   Eyes:      Extraocular Movements: Extraocular movements intact.      Pupils: Pupils are equal, round, and reactive to light.   Cardiovascular:      Rate and Rhythm: Normal rate and regular rhythm.      Heart sounds: Murmur heard.   Musculoskeletal:      Cervical back: Normal range of motion.      Comments: Surgical site appeared to be intact.  Pain with movement   Neurological:      Mental Status: He is disoriented.       Significant Labs: All pertinent labs within the past 24 hours have been reviewed.  Recent Lab Results       None            Significant Imaging: I have reviewed all pertinent imaging results/findings within the past 24 hours.

## 2022-09-23 NOTE — PLAN OF CARE
Problem: Infection  Goal: Absence of Infection Signs and Symptoms  Outcome: Ongoing, Not Progressing     Problem: Adult Inpatient Plan of Care  Goal: Plan of Care Review  Outcome: Ongoing, Not Progressing  Goal: Patient-Specific Goal (Individualized)  Outcome: Ongoing, Not Progressing  Goal: Absence of Hospital-Acquired Illness or Injury  Outcome: Ongoing, Not Progressing  Goal: Optimal Comfort and Wellbeing  Outcome: Ongoing, Not Progressing  Goal: Readiness for Transition of Care  Outcome: Ongoing, Not Progressing     Problem: Skin Injury Risk Increased  Goal: Skin Health and Integrity  Outcome: Ongoing, Not Progressing     Problem: Fall Injury Risk  Goal: Absence of Fall and Fall-Related Injury  Outcome: Ongoing, Not Progressing     Problem: Balance Impairment (Functional Deficit)  Goal: Improved Balance and Postural Control  Outcome: Ongoing, Not Progressing     Problem: Muscle Strength Impairment (Functional Deficit)  Goal: Improved Muscle Strength  Outcome: Ongoing, Not Progressing     Problem: Range of Motion Impairment (Functional Deficit)  Goal: Optimal Range of Motion  Outcome: Ongoing, Not Progressing

## 2022-09-24 PROCEDURE — 63600175 PHARM REV CODE 636 W HCPCS: Performed by: INTERNAL MEDICINE

## 2022-09-24 PROCEDURE — 99900035 HC TECH TIME PER 15 MIN (STAT)

## 2022-09-24 PROCEDURE — 27000221 HC OXYGEN, UP TO 24 HOURS

## 2022-09-24 PROCEDURE — 94761 N-INVAS EAR/PLS OXIMETRY MLT: CPT

## 2022-09-24 PROCEDURE — 11000001 HC ACUTE MED/SURG PRIVATE ROOM

## 2022-09-24 PROCEDURE — S5010 5% DEXTROSE AND 0.45% SALINE: HCPCS | Performed by: EMERGENCY MEDICINE

## 2022-09-24 PROCEDURE — 25000003 PHARM REV CODE 250: Performed by: INTERNAL MEDICINE

## 2022-09-24 PROCEDURE — 25000003 PHARM REV CODE 250: Performed by: EMERGENCY MEDICINE

## 2022-09-24 PROCEDURE — 63600175 PHARM REV CODE 636 W HCPCS: Performed by: EMERGENCY MEDICINE

## 2022-09-24 PROCEDURE — 97530 THERAPEUTIC ACTIVITIES: CPT

## 2022-09-24 RX ADMIN — PIPERACILLIN SODIUM AND TAZOBACTAM SODIUM 4.5 G: 4; 500 INJECTION, POWDER, FOR SOLUTION INTRAVENOUS at 09:09

## 2022-09-24 RX ADMIN — FAMOTIDINE 20 MG: 10 INJECTION, SOLUTION INTRAVENOUS at 09:09

## 2022-09-24 RX ADMIN — PIPERACILLIN SODIUM AND TAZOBACTAM SODIUM 4.5 G: 4; 500 INJECTION, POWDER, FOR SOLUTION INTRAVENOUS at 02:09

## 2022-09-24 RX ADMIN — ENOXAPARIN SODIUM 30 MG: 30 INJECTION SUBCUTANEOUS at 06:09

## 2022-09-24 RX ADMIN — DEXTROSE AND SODIUM CHLORIDE: 5; 450 INJECTION, SOLUTION INTRAVENOUS at 06:09

## 2022-09-24 RX ADMIN — PIPERACILLIN SODIUM AND TAZOBACTAM SODIUM 4.5 G: 4; 500 INJECTION, POWDER, FOR SOLUTION INTRAVENOUS at 06:09

## 2022-09-24 NOTE — PT/OT/SLP PROGRESS
Physical Therapy Treatment    Patient Name:  Blaze Turner   MRN:  [id number]    Recommendations:     Discharge Recommendations:  nursing facility, skilled   Discharge Equipment Recommendations:     Barriers to discharge:  physical assistance level family can provide,     Assessment:     Blaze Turner is a 89 y.o. male admitted with a medical diagnosis of Closed fracture of right hip.  He presents with the following impairments/functional limitations:  weakness, impaired endurance, impaired functional mobility, gait instability, impaired balance, pain difficulty with maintaining PWB RLE. Family/caregiver reports he got patient OOB yesterday- reinforced calling for staff assistance due to level of assistance needed and the importance of maintaining PWB RLE, which patient is currently having difficulty doing.    Rehab Prognosis: Fair; patient would benefit from acute skilled PT services to address these deficits and reach maximum level of function.    Recent Surgery: Procedure(s) (LRB):  ARTHROPLASTY, HIP REPLACEMENT (Endo Hip) (Right) 7 Days Post-Op    Plan:     During this hospitalization, patient to be seen daily to address the identified rehab impairments via therapeutic activities, therapeutic exercises and progress toward the following goals:    Plan of Care Expires:  10/17/22    Subjective     Chief Complaint: pain, fatigue  Patient/Family Comments/goals: to return to PLOF.  Pain/Comfort:  Pain Rating 1: 5/10  Location - Side 1: Right  Location - Orientation 1: anterior  Location 1: hip  Pain Addressed 1: Reposition  Pain Rating Post-Intervention 1: 5/10      Objective:     Communicated with nurse prior to session.  Patient found HOB elevated with peripheral IV upon PT entry to room.     General Precautions: Standard, fall   Orthopedic Precautions:RLE partial weight bearing   Braces:    Respiratory Status: Room air     Functional Mobility:  Bed Mobility:     Rolling Left:  moderate assistance  Rolling  Right: moderate assistance  Supine to Sit: maximal assistance  Sit to Supine: maximal assistance  Transfers:     Sit to Stand:  maximal assistance with rolling walker and constant cuing to maintain upright and PWB RLE  Balance: sitting EOB x approx. 8 minutes with Poor + balance, encouragement to continue task      AM-PAC 6 CLICK MOBILITY          Therapeutic Activities and Exercises:   As stated above, gentle supine ROM , education to family on the importance of assistance for mobility, importance of PWB.    Patient left HOB elevated with call button in reach and family present..    GOALS:   Multidisciplinary Problems       Physical Therapy Goals          Problem: Physical Therapy    Goal Priority Disciplines Outcome Goal Variances Interventions   Physical Therapy Goal     PT, PT/OT Ongoing, Progressing     Description: Goals to be met by: 10/17/22     Patient will increase functional independence with mobility by performin. Supine to sit with MInimal Assistance  2. Sit to stand transfer with Minimal Assistance  3. Gait  x 150 feet with Contact Guard Assistance using Rolling Walker.                          Time Tracking:     PT Received On:    PT Start Time: 15     PT Stop Time: 935  PT Total Time (min): 20 min     Billable Minutes: Therapeutic Activity 20    Treatment Type: Treatment  PT/PTA: PT           2022

## 2022-09-24 NOTE — PROGRESS NOTES
[facility] Medicine  Progress Note    Patient Name: Blaze Turnre  MRN: [id number]  Patient Class: IP- Inpatient   Admission Date: 9/16/2022  Length of Stay: 8 days  Attending Physician: Gus Hewitt MD  Primary Care Provider: Gaudencio Munguia MD        Subjective:     Principal Problem:Closed fracture of right hip        HPI:  89-year-old male with hx Dementia,  HTN, Trigeminal neuralgia, Afib, CKD stage 3 baseline Cr 1.5, brought in by his son after fall at home patient states he could not bear weight he was complaining of significant right hip pain so the son brought him to the emergency department.  No LOC, no chest pain, no F/C, no chest pain per daughter. PT takesTrileptal 150 mg daily per daughter. Pt has developed rash for past 5 days per daughter who stays with him. Pt was scratching and agitated he was give Ativan in Ed, now sleeping. Daughter at bedside. Pt is DNR per daughter          Overview/Hospital Course:    9/18/22  Pt has remained lethargic and unarousable since surgery yesterday. Seem to attempt to wake up and mumbles with rough stimulation. Labs were ordered but not drawn this morning. Labs reordered and currently pending.    9/19/22  Upon entering room, son was holding emesis basin for pt as he was sitting up reclined leaning over to right with audible gurgling and spitting up a bit; no real vomitus. He was also coughing as well but not forceful enough to be effective. Son states pt has been awake and alert this morning. He ate a little breakfast and had just eaten some muffin f/b water. Son states RT then came in and had pt use IS and that's when he thinks he sucked in some food and began gagging and coughing. RT states pt had gurgling noises before IS attempted and wasn't able to perform d/t incoordination. RT then deep suctioned using Yonkuer and pt cleared.    09/20/2022.   patient can be somnolent.  Family stated that episodes of emesis  at home and questionable choking with liquids.  This has been happened several times at home   Continue to monitor patient closely  Will have speech to evaluate to deter will be the safest food take by mouth.  Discussed case with family members and eventually they are not recommending any PEG tube placement at this time   Will work with  toward a skilled nursing facility treatment.  09/21/2022.    Patient continues to be somnolent.    Speech evaluation persistent with aspirations not good swelling.  Chest x-ray confirms patient aspirates   Keep patient NPO   Hold IV fluid since patient has been on Lasix in the past questionable history of congestion CHF  Will check proBNP in the morning repeat chest x-ray   Start patient on Zosyn for aspiration pneumonia.    Discussed case with family and .  Most likely this will be an LTAC case.  Patient is do not resuscitate code status her patient's and family member.    02/22/2022.    Patient more awake alert today.  Follows few commands.  Still anxious and confused.    Discussed case with family members.    Will start patient on low-dose IV fluid.  Follow up with speech.    Regardless of outcome family members do not want patient to have a PEG.    09/23/2022.    No significant changes compared to yesterday.    Discussed with family plan.  Since no need for PEG placement per patient's request we were not going to have a swallow study done and modified barium.    will try to treat patient with thickened liquids as tolerated.  Family are willing to consider alternative medical treatment including comfort measures.  Will continue IV antibiotics   continue nebulizer treatment  Follow-up the next 24-48 hours.  09/24/2022.    Patient awake.  Alert follows few commands.  Still confused overall.  Family at bedside  Since not eating for a while family has advised to start something p.o. despite patient risk of aspiration.    They do not advise any artificial  feeding including PPN or PEG tube feeding.    Continue gentle hydration IV.  Thickened liquid and pureed diet.  Follow up in the morning   Continue IV antibiotic.      Interval History:  Patient stable.  Nurse reports no new issues overnight.  Episodes of coughing.  Pain control    Review of Systems   Reason unable to perform ROS: Patient was dementia not able to provide much information.   Objective:     Vital Signs (Most Recent):  Temp: 97.9 °F (36.6 °C) (09/24/22 1145)  Pulse: 89 (09/24/22 1145)  Resp: 18 (09/24/22 1145)  BP: (!) 142/74 (09/24/22 1145)  SpO2: 96 % (09/24/22 1145)   Vital Signs (24h Range):  Temp:  [97.9 °F (36.6 °C)-100.2 °F (37.9 °C)] 97.9 °F (36.6 °C)  Pulse:  [] 89  Resp:  [18] 18  SpO2:  [95 %-96 %] 96 %  BP: (128-166)/(67-77) 142/74     Weight: 67.6 kg (149 lb 0.5 oz)  Body mass index is 21.38 kg/m².  No intake or output data in the 24 hours ending 09/24/22 1509   Physical Exam  Vitals and nursing note reviewed.   Constitutional:       Comments: Cachectic, frail but not distressed   HENT:      Head: Normocephalic.      Mouth/Throat:      Mouth: Mucous membranes are dry.   Eyes:      Extraocular Movements: Extraocular movements intact.      Pupils: Pupils are equal, round, and reactive to light.   Cardiovascular:      Rate and Rhythm: Normal rate and regular rhythm.      Heart sounds: Murmur heard.   Pulmonary:      Comments: Coarse breathing bilaterally rhonchi bilateral basilar area.  Abdominal:      General: Bowel sounds are normal.      Palpations: Abdomen is soft.   Musculoskeletal:      Cervical back: Normal range of motion.      Comments: Pain in the right hip.  No swelling no signs of infection.   Skin:     Capillary Refill: Capillary refill takes less than 2 seconds.      Findings: Bruising present.   Neurological:      Mental Status: Mental status is at baseline.   Psychiatric:         Mood and Affect: Mood normal.       Significant Labs: All pertinent labs within the past 24  hours have been reviewed.  Recent Lab Results       None            Significant Imaging: I have reviewed all pertinent imaging results/findings within the past 24 hours.      Assessment/Plan:      * Closed fracture of right hip  Continue with postop order.  Follow up with PT OT.      Aspiration pneumonitis  Continue IV antibiotics.    Repeat chest x-ray.    Discussed with family and they are okay to start something by mouth.  They refuse any other alternative feeding        Encephalopathy, metabolic  This is a combination of patient's baseline and infection.      Chronic pulmonary aspiration  Per family request.  Start something by mouth despite patient having risk of aspiration.    Any other alternative feeding artificial is not recommended by patient's wishes.      Paroxysmal A-fib  Will use Cardizem IV push as needed for elevated heart rate more than 110.      DNR (do not resuscitate)  Patient and family have wished to make as no other their wishes are for to do not resuscitate patient.  Patient agrees with decision      Malnutrition of moderate degree  Nutrition consulted. Most recent weight and BMI monitored-                         Measurements:  Wt Readings from Last 1 Encounters:   09/16/22 67.6 kg (149 lb)   Body mass index is 21.38 kg/m².    Recommendations:      Patient has been screened and assessed by RD. RD will follow patient.      Dementia  Continue supportive care      Rash  Likely due to meds, ? Trileptal, may apply cortisol cream PRN      VTE Risk Mitigation (From admission, onward)         Ordered     enoxaparin injection 30 mg  Daily         09/16/22 2307     IP VTE HIGH RISK PATIENT  Once         09/16/22 2307     Place EUGENIA hose  Until discontinued         09/16/22 2307                Discharge Planning   LEON:      Code Status: DNR   Is the patient medically ready for discharge?:     Reason for patient still in hospital (select all that apply): Treatment                     Alok Murrieta  MD  Department of Hospital Medicine   [facility]

## 2022-09-24 NOTE — ASSESSMENT & PLAN NOTE
Per family request.  Start something by mouth despite patient having risk of aspiration.    Any other alternative feeding artificial is not recommended by patient's wishes.

## 2022-09-24 NOTE — PROGRESS NOTES
[facility] Medical Surgical Unit  Adult Nutrition  Progress Note    SUMMARY       Recommendations    Recommendation/Intervention:     SLP recommends pt remain NPO as pt is not safe for po intake at this time following pleasure feeds with ice chips today. ST will reassess on Monday.  Pt does not want PEG. RD available to provide parenteral recs if medically appropriate. Please consult if parenteral recs desired/warranted.     RD following and available as needed.  Thank you.    Goals: Meet >/= 75% EN by d/c.    Nutrition Goal Status: new    Communication of RD Recs: discussed on rounds    Assessment and Plan    PES: Inadequate energy intake related to Dysphagia as evidenced by Pt NPO. Not safe for po intake. (new)  Service: Nutrition       Malnutrition Assessment    Malnutrition in the context of chronic illness    Degree of Malnutrition:  Severe Malnutrition  Energy Intake:  </= 75% of estimated energy requirement for >/= 1 month  Interpretation of Weight Loss:  unable to obtain  Body Fat: severe depletion  Area of Body Fat Loss:  orbital region , upper arm region - triceps / biceps and thoracic and lumbar region - ribs, lower back, midaxillary line  Muscle Mass Loss:  severe depletion  Area of Muscle Mass Loss: temple region - temporalis muscle, clavicle bone region - pectoralis major, deltoid, trapezius muscles, clavicle and acromion bone region - deltoid muscle and scapular bone region - trapezius, supraspinus, infraspinus muscles  Fluid Accumulation:  unable to obtain  Edema: unable to obtain  Reduced  Strength:  not applicable    A minimum of two characteristics is recommended for diagnosis of either severe or non-severe malnutrition.            Reason for Assessment    Reason For Assessment: length of stay  Diagnosis: other (see comments) (Closed Fracture of Right Hip.)  Relevant Medical History: Closed fracture of right hip.        Aspiration pneumonitis  Continue treatment.  morning  continue Zosyn           Encephalopathy, metabolic  This is a combination of patient's baseline and infection.        Chronic pulmonary aspiration  For family peg tube not an option since patient's wishes are not to be fed artificially  Since no need for PEG will discontinue modified barium sella  will discuss with nutritionist to find the safest possible p.o. intake.    will follow up with family and patient        Paroxysmal A-fib  Will use Cardizem IV push as needed for elevated heart rate more than 110.        DNR (do not resuscitate)  Patient and family have wished to make as no other their wishes are for to do not resuscitate patient.  Patient agrees with decision        Malnutrition of moderate degree  General Information Comments: 9/23: Pt with severe muscle and fat wasting. Noted pt does not wish to be fed artifically and does not want a PEG tube.  Pt recently failed MBSS and remains NPO.  Second MBSS cancled today.  Discussed with SLP the possibility of pt's being cleared for po intake (maybe thickened liquids) and if so modifications needed for safest p.o. intake. Observed SLP conduct BSE, pleasure feeds with ice chips today.  Pt was able to follow swallow commands and SLP reports comprehension better today; however, observed pt with inconsistent alertness as SLP  had to constantly tell pt to open his eyes during feeds. Pt also had  having significant coughing episodes with ice chips and requiring suctioning often. SLP recommends pt remain NPO over the weekend and will reevaluate swallow on Monday. RD available to provide parenteral recs if medically appropriate.  Will continue to monitor during stay.    Nutrition Risk Screen    Nutrition Risk Screen: dysphagia or difficulty swallowing    Nutrition/Diet History    Food Allergies: NKFA  Factors Affecting Nutritional Intake: difficulty/impaired swallowing, impaired cognitive status/motor control, inability to feed self    Anthropometrics    Temp: 100.2 °F  "(37.9 °C)  Height: 5' 10" (177.8 cm)  Height (inches): 70 in  Weight Method: Bed Scale  Weight: 67.6 kg (149 lb 0.5 oz)  Weight (lb): 149.03 lb  Ideal Body Weight (IBW), Male: 166 lb  % Ideal Body Weight, Male (lb): 89.78 %  BMI (Calculated): 21.4  BMI Grade: 18.5-24.9 - normal       Lab/Procedures/Meds    Pertinent Labs Reviewed: reviewed  Pertinent Labs Comments: 9/23: No new labs.  Pertinent Medications Reviewed: reviewed  Pertinent Medications Comments: D5 and 0.45% NaCl; Lovenox.    Physical Findings/Assessment         Estimated/Assessed Needs    Weight Used For Calorie Calculations: 68 kg (149 lb 14.6 oz)  Energy Calorie Requirements (kcal): 1700 to 2000 kcals/kg/ABW  Energy Need Method: Kcal/kg  Protein Requirements: 90 to 115 g/kg/IBW  Weight Used For Protein Calculations: 75.3 kg (166 lb)  Fluid Requirements (mL): 1700 to 2000 mL/day (1mL/kcal)     RDA Method (mL): 1700         Nutrition Prescription Ordered    Current Diet Order: NPO  Nutrition Order Comments: SLP recommends pt remain NPO as pt is not safe for po intake at this time following pleasure feeds with ice chips today. ST will reassess on Monday.  Pt does not want PEG. RD available to provide parenteral recs if medically appropriate.    Evaluation of Received Nutrient/Fluid Intake    Energy Calories Required: not meeting needs  Protein Required: not meeting needs  Fluid Required: not meeting needs  Tolerance: not tolerating  % Intake of Estimated Energy Needs: 0 - 25 %  % Meal Intake: NPO    Nutrition Risk    Level of Risk/Frequency of Follow-up: high     Monitor and Evaluation    Food and Nutrient Intake: energy intake, food and beverage intake  Food and Nutrient Adminstration: diet order  Anthropometric Measurements: weight, weight change  Biochemical Data, Medical Tests and Procedures: gastrointestinal profile, electrolyte and renal panel, inflammatory profile, glucose/endocrine profile, lipid profile     Nutrition Follow-Up    RD Follow-up?: " Yes

## 2022-09-24 NOTE — ASSESSMENT & PLAN NOTE
Continue IV antibiotics.    Repeat chest x-ray.    Discussed with family and they are okay to start something by mouth.  They refuse any other alternative feeding

## 2022-09-24 NOTE — SUBJECTIVE & OBJECTIVE
Interval History:  Patient stable.  Nurse reports no new issues overnight.  Episodes of coughing.  Pain control    Review of Systems   Reason unable to perform ROS: Patient was dementia not able to provide much information.   Objective:     Vital Signs (Most Recent):  Temp: 97.9 °F (36.6 °C) (09/24/22 1145)  Pulse: 89 (09/24/22 1145)  Resp: 18 (09/24/22 1145)  BP: (!) 142/74 (09/24/22 1145)  SpO2: 96 % (09/24/22 1145)   Vital Signs (24h Range):  Temp:  [97.9 °F (36.6 °C)-100.2 °F (37.9 °C)] 97.9 °F (36.6 °C)  Pulse:  [] 89  Resp:  [18] 18  SpO2:  [95 %-96 %] 96 %  BP: (128-166)/(67-77) 142/74     Weight: 67.6 kg (149 lb 0.5 oz)  Body mass index is 21.38 kg/m².  No intake or output data in the 24 hours ending 09/24/22 1509   Physical Exam  Vitals and nursing note reviewed.   Constitutional:       Comments: Cachectic, frail but not distressed   HENT:      Head: Normocephalic.      Mouth/Throat:      Mouth: Mucous membranes are dry.   Eyes:      Extraocular Movements: Extraocular movements intact.      Pupils: Pupils are equal, round, and reactive to light.   Cardiovascular:      Rate and Rhythm: Normal rate and regular rhythm.      Heart sounds: Murmur heard.   Pulmonary:      Comments: Coarse breathing bilaterally rhonchi bilateral basilar area.  Abdominal:      General: Bowel sounds are normal.      Palpations: Abdomen is soft.   Musculoskeletal:      Cervical back: Normal range of motion.      Comments: Pain in the right hip.  No swelling no signs of infection.   Skin:     Capillary Refill: Capillary refill takes less than 2 seconds.      Findings: Bruising present.   Neurological:      Mental Status: Mental status is at baseline.   Psychiatric:         Mood and Affect: Mood normal.       Significant Labs: All pertinent labs within the past 24 hours have been reviewed.  Recent Lab Results       None            Significant Imaging: I have reviewed all pertinent imaging results/findings within the past 24  hours.

## 2022-09-24 NOTE — PLAN OF CARE
Problem: Infection  Goal: Absence of Infection Signs and Symptoms  Outcome: Ongoing, Progressing     Problem: Skin Injury Risk Increased  Goal: Skin Health and Integrity  Outcome: Ongoing, Progressing     Problem: Fall Injury Risk  Goal: Absence of Fall and Fall-Related Injury  Outcome: Ongoing, Progressing     Problem: Adult Inpatient Plan of Care  Goal: Plan of Care Review  Outcome: Ongoing, Progressing  Goal: Patient-Specific Goal (Individualized)  Outcome: Ongoing, Progressing  Goal: Absence of Hospital-Acquired Illness or Injury  Outcome: Ongoing, Progressing  Goal: Optimal Comfort and Wellbeing  Outcome: Ongoing, Progressing

## 2022-09-25 PROCEDURE — 63600175 PHARM REV CODE 636 W HCPCS: Performed by: INTERNAL MEDICINE

## 2022-09-25 PROCEDURE — 63600175 PHARM REV CODE 636 W HCPCS: Performed by: EMERGENCY MEDICINE

## 2022-09-25 PROCEDURE — 11000001 HC ACUTE MED/SURG PRIVATE ROOM

## 2022-09-25 PROCEDURE — 25000242 PHARM REV CODE 250 ALT 637 W/ HCPCS: Performed by: EMERGENCY MEDICINE

## 2022-09-25 PROCEDURE — 94640 AIRWAY INHALATION TREATMENT: CPT

## 2022-09-25 PROCEDURE — 99900035 HC TECH TIME PER 15 MIN (STAT)

## 2022-09-25 PROCEDURE — 25000003 PHARM REV CODE 250: Performed by: EMERGENCY MEDICINE

## 2022-09-25 PROCEDURE — 94761 N-INVAS EAR/PLS OXIMETRY MLT: CPT

## 2022-09-25 PROCEDURE — 27000221 HC OXYGEN, UP TO 24 HOURS

## 2022-09-25 PROCEDURE — 25000003 PHARM REV CODE 250: Performed by: INTERNAL MEDICINE

## 2022-09-25 RX ADMIN — ENOXAPARIN SODIUM 30 MG: 30 INJECTION SUBCUTANEOUS at 05:09

## 2022-09-25 RX ADMIN — PIPERACILLIN AND TAZOBACTAM 4.5 G: 4; .5 INJECTION, POWDER, LYOPHILIZED, FOR SOLUTION INTRAVENOUS; PARENTERAL at 05:09

## 2022-09-25 RX ADMIN — FAMOTIDINE 20 MG: 10 INJECTION, SOLUTION INTRAVENOUS at 09:09

## 2022-09-25 RX ADMIN — IPRATROPIUM BROMIDE AND ALBUTEROL SULFATE 3 ML: 2.5; .5 SOLUTION RESPIRATORY (INHALATION) at 07:09

## 2022-09-25 RX ADMIN — PIPERACILLIN SODIUM AND TAZOBACTAM SODIUM 4.5 G: 4; 500 INJECTION, POWDER, FOR SOLUTION INTRAVENOUS at 02:09

## 2022-09-25 RX ADMIN — PIPERACILLIN SODIUM AND TAZOBACTAM SODIUM 4.5 G: 4; 500 INJECTION, POWDER, FOR SOLUTION INTRAVENOUS at 09:09

## 2022-09-25 NOTE — SUBJECTIVE & OBJECTIVE
Interval History: Patient doing little be    Review of Systems   Reason unable to perform ROS: Patient not able to provide information due to dementia.   All other systems reviewed and are negative.  Objective:     Vital Signs (Most Recent):  Temp: 98.8 °F (37.1 °C) (09/25/22 1121)  Pulse: 87 (09/25/22 1121)  Resp: 20 (09/25/22 0751)  BP: 119/70 (09/25/22 1121)  SpO2: 95 % (09/25/22 1121) Vital Signs (24h Range):  Temp:  [98.1 °F (36.7 °C)-99.5 °F (37.5 °C)] 98.8 °F (37.1 °C)  Pulse:  [] 87  Resp:  [18-20] 20  SpO2:  [94 %-97 %] 95 %  BP: (105-176)/(63-78) 119/70     Weight: 67.6 kg (149 lb 0.5 oz)  Body mass index is 21.38 kg/m².  No intake or output data in the 24 hours ending 09/25/22 1539   Physical Exam  Vitals and nursing note reviewed.   Constitutional:       Comments: Frail and weak   HENT:      Head: Normocephalic and atraumatic.      Mouth/Throat:      Mouth: Mucous membranes are dry.   Eyes:      Extraocular Movements: Extraocular movements intact.      Pupils: Pupils are equal, round, and reactive to light.   Cardiovascular:      Rate and Rhythm: Normal rate and regular rhythm.   Pulmonary:      Effort: Pulmonary effort is normal.      Breath sounds: Decreased air movement present. Examination of the right-lower field reveals rhonchi. Examination of the left-lower field reveals rhonchi. Wheezing, rhonchi and rales present.   Abdominal:      General: Abdomen is flat. Bowel sounds are decreased.   Musculoskeletal:      Cervical back: Full passive range of motion without pain, normal range of motion and neck supple.      Comments: Muscle wasting in upper lower extremities with decreased   Strength include hand .   Skin:     Comments:  Loss of subcutaneous tissues.   Neurological:      Mental Status: He is lethargic.      Cranial Nerves: Cranial nerves 2-12 are intact.       Significant Labs: All pertinent labs within the past 24 hours have been reviewed.  Recent Lab Results       None             Significant Imaging: I have reviewed all pertinent imaging results/findings within the past 24 hours.

## 2022-09-25 NOTE — PT/OT/SLP PROGRESS
Physical Therapy      Patient Name:  Blaze Turner   MRN:  [id number]    Patient not seen today secondary to Unarousable. Patient's family reports patient just resting, soundly.Will follow-up 9/26/22.

## 2022-09-25 NOTE — ASSESSMENT & PLAN NOTE
Continue IV antibiotics.    Repeat chest x-ray.      Discontinue IV fluid and IV antibiotic tomorrow.

## 2022-09-25 NOTE — PROGRESS NOTES
[facility] Medicine  Progress Note    Patient Name: Blaze Turner  MRN: [id number]  Patient Class: IP- Inpatient   Admission Date: 9/16/2022  Length of Stay: 9 days  Attending Physician: Gus Hewitt MD  Primary Care Provider: Gaudencio Munguia MD        Subjective:     Principal Problem:Closed fracture of right hip        HPI:  89-year-old male with hx Dementia,  HTN, Trigeminal neuralgia, Afib, CKD stage 3 baseline Cr 1.5, brought in by his son after fall at home patient states he could not bear weight he was complaining of significant right hip pain so the son brought him to the emergency department.  No LOC, no chest pain, no F/C, no chest pain per daughter. PT takesTrileptal 150 mg daily per daughter. Pt has developed rash for past 5 days per daughter who stays with him. Pt was scratching and agitated he was give Ativan in Ed, now sleeping. Daughter at bedside. Pt is DNR per daughter          Overview/Hospital Course:    9/18/22  Pt has remained lethargic and unarousable since surgery yesterday. Seem to attempt to wake up and mumbles with rough stimulation. Labs were ordered but not drawn this morning. Labs reordered and currently pending.    9/19/22  Upon entering room, son was holding emesis basin for pt as he was sitting up reclined leaning over to right with audible gurgling and spitting up a bit; no real vomitus. He was also coughing as well but not forceful enough to be effective. Son states pt has been awake and alert this morning. He ate a little breakfast and had just eaten some muffin f/b water. Son states RT then came in and had pt use IS and that's when he thinks he sucked in some food and began gagging and coughing. RT states pt had gurgling noises before IS attempted and wasn't able to perform d/t incoordination. RT then deep suctioned using Yonkuer and pt cleared.    09/20/2022.   patient can be somnolent.  Family stated that episodes of emesis  at home and questionable choking with liquids.  This has been happened several times at home   Continue to monitor patient closely  Will have speech to evaluate to deter will be the safest food take by mouth.  Discussed case with family members and eventually they are not recommending any PEG tube placement at this time   Will work with  toward a skilled nursing facility treatment.  09/21/2022.    Patient continues to be somnolent.    Speech evaluation persistent with aspirations not good swelling.  Chest x-ray confirms patient aspirates   Keep patient NPO   Hold IV fluid since patient has been on Lasix in the past questionable history of congestion CHF  Will check proBNP in the morning repeat chest x-ray   Start patient on Zosyn for aspiration pneumonia.    Discussed case with family and .  Most likely this will be an LTAC case.  Patient is do not resuscitate code status her patient's and family member.    02/22/2022.      Patient more awake alert today.  Follows few commands.  Still anxious and confused.    Discussed case with family members.    Will start patient on low-dose IV fluid.  Follow up with speech.    Regardless of outcome family members do not want patient to have a PEG.      09/23/2022.      No significant changes compared to yesterday.    Discussed with family plan.  Since no need for PEG placement per patient's request we were not going to have a swallow study done and modified barium.    will try to treat patient with thickened liquids as tolerated.  Family are willing to consider alternative medical treatment including comfort measures.  Will continue IV antibiotics   continue nebulizer treatment  Follow-up the next 24-48 hours.    09/24/2022.      Patient awake.  Alert follows few commands.  Still confused overall.  Family at bedside  Since not eating for a while family has advised to start something p.o. despite patient risk of aspiration.    They do not advise any  artificial feeding including PPN or PEG tube feeding.    Continue gentle hydration IV.  Thickened liquid and pureed diet.  Follow up in the morning   Continue IV antibiotic.    09/25/2022  Patient doing the same. Min PO yesterday. Still episodes of aspiration.   Will continue IV antibiotic and dc in am.   Family request no artificial feeding at this point.       Interval History: Patient doing little be    Review of Systems   Reason unable to perform ROS: Patient not able to provide information due to dementia.   All other systems reviewed and are negative.  Objective:     Vital Signs (Most Recent):  Temp: 98.8 °F (37.1 °C) (09/25/22 1121)  Pulse: 87 (09/25/22 1121)  Resp: 20 (09/25/22 0751)  BP: 119/70 (09/25/22 1121)  SpO2: 95 % (09/25/22 1121) Vital Signs (24h Range):  Temp:  [98.1 °F (36.7 °C)-99.5 °F (37.5 °C)] 98.8 °F (37.1 °C)  Pulse:  [] 87  Resp:  [18-20] 20  SpO2:  [94 %-97 %] 95 %  BP: (105-176)/(63-78) 119/70     Weight: 67.6 kg (149 lb 0.5 oz)  Body mass index is 21.38 kg/m².  No intake or output data in the 24 hours ending 09/25/22 1539   Physical Exam  Vitals and nursing note reviewed.   Constitutional:       Comments: Frail and weak   HENT:      Head: Normocephalic and atraumatic.      Mouth/Throat:      Mouth: Mucous membranes are dry.   Eyes:      Extraocular Movements: Extraocular movements intact.      Pupils: Pupils are equal, round, and reactive to light.   Cardiovascular:      Rate and Rhythm: Normal rate and regular rhythm.   Pulmonary:      Effort: Pulmonary effort is normal.      Breath sounds: Decreased air movement present. Examination of the right-lower field reveals rhonchi. Examination of the left-lower field reveals rhonchi. Wheezing, rhonchi and rales present.   Abdominal:      General: Abdomen is flat. Bowel sounds are decreased.   Musculoskeletal:      Cervical back: Full passive range of motion without pain, normal range of motion and neck supple.      Comments: Muscle  wasting in upper lower extremities with decreased   Strength include hand .   Skin:     Comments:  Loss of subcutaneous tissues.   Neurological:      Mental Status: He is lethargic.      Cranial Nerves: Cranial nerves 2-12 are intact.       Significant Labs: All pertinent labs within the past 24 hours have been reviewed.  Recent Lab Results       None            Significant Imaging: I have reviewed all pertinent imaging results/findings within the past 24 hours.      Assessment/Plan:      * Closed fracture of right hip  Continue with postop order.  Follow up with PT OT.      Aspiration pneumonitis  Continue IV antibiotics.    Repeat chest x-ray.      Discontinue IV fluid and IV antibiotic tomorrow.      Encephalopathy, metabolic  This is a combination of patient's baseline and infection.      Chronic pulmonary aspiration  Per family request.  Start something by mouth despite patient having risk of aspiration.    Any other alternative feeding artificial is not recommended by patient's wishes.      Paroxysmal A-fib  Will use Cardizem IV push as needed for elevated heart rate more than 110.      DNR (do not resuscitate)  Patient and family have wished to make as no other their wishes are for to do not resuscitate patient.  Patient agrees with decision      Malnutrition of moderate degree  Nutrition consulted. Most recent weight and BMI monitored-                         Measurements:  Wt Readings from Last 1 Encounters:   09/16/22 67.6 kg (149 lb)   Body mass index is 21.38 kg/m².    Recommendations:      Patient has been screened and assessed by RD. RD will follow patient.      Dementia  Continue supportive care      Rash  Likely due to meds, ? Trileptal, may apply cortisol cream PRN      VTE Risk Mitigation (From admission, onward)         Ordered     enoxaparin injection 30 mg  Daily         09/16/22 2307     IP VTE HIGH RISK PATIENT  Once         09/16/22 2307     Place EUGENIA hose  Until discontinued          09/16/22 2303                Discharge Planning   LEON:      Code Status: DNR   Is the patient medically ready for discharge?:     Reason for patient still in hospital (select all that apply): Treatment           Anticipated discharge in the morning.          Alok Murrieta MD  Department of Hospital Medicine   [facility]

## 2022-09-26 LAB — SARS-COV-2 RDRP RESP QL NAA+PROBE: POSITIVE

## 2022-09-26 PROCEDURE — 63600175 PHARM REV CODE 636 W HCPCS: Performed by: EMERGENCY MEDICINE

## 2022-09-26 PROCEDURE — 25000003 PHARM REV CODE 250: Performed by: EMERGENCY MEDICINE

## 2022-09-26 PROCEDURE — 87635 SARS-COV-2 COVID-19 AMP PRB: CPT | Performed by: EMERGENCY MEDICINE

## 2022-09-26 PROCEDURE — 63600175 PHARM REV CODE 636 W HCPCS: Performed by: INTERNAL MEDICINE

## 2022-09-26 PROCEDURE — 99900035 HC TECH TIME PER 15 MIN (STAT)

## 2022-09-26 PROCEDURE — 27000221 HC OXYGEN, UP TO 24 HOURS

## 2022-09-26 PROCEDURE — 94761 N-INVAS EAR/PLS OXIMETRY MLT: CPT

## 2022-09-26 PROCEDURE — 97530 THERAPEUTIC ACTIVITIES: CPT

## 2022-09-26 PROCEDURE — 25000003 PHARM REV CODE 250: Performed by: INTERNAL MEDICINE

## 2022-09-26 PROCEDURE — 94640 AIRWAY INHALATION TREATMENT: CPT

## 2022-09-26 PROCEDURE — 25000242 PHARM REV CODE 250 ALT 637 W/ HCPCS: Performed by: EMERGENCY MEDICINE

## 2022-09-26 PROCEDURE — 92526 ORAL FUNCTION THERAPY: CPT

## 2022-09-26 PROCEDURE — 11000001 HC ACUTE MED/SURG PRIVATE ROOM

## 2022-09-26 RX ADMIN — FAMOTIDINE 20 MG: 10 INJECTION, SOLUTION INTRAVENOUS at 08:09

## 2022-09-26 RX ADMIN — HYDROCORTISONE: 1 CREAM TOPICAL at 09:09

## 2022-09-26 RX ADMIN — PIPERACILLIN AND TAZOBACTAM 4.5 G: 4; .5 INJECTION, POWDER, LYOPHILIZED, FOR SOLUTION INTRAVENOUS; PARENTERAL at 09:09

## 2022-09-26 RX ADMIN — ENOXAPARIN SODIUM 30 MG: 30 INJECTION SUBCUTANEOUS at 04:09

## 2022-09-26 RX ADMIN — PIPERACILLIN AND TAZOBACTAM 4.5 G: 4; .5 INJECTION, POWDER, LYOPHILIZED, FOR SOLUTION INTRAVENOUS; PARENTERAL at 02:09

## 2022-09-26 RX ADMIN — IPRATROPIUM BROMIDE AND ALBUTEROL SULFATE 3 ML: 2.5; .5 SOLUTION RESPIRATORY (INHALATION) at 07:09

## 2022-09-26 NOTE — PLAN OF CARE
Per Marielle at [facility], they will not accept pt without him having a PEG or him w/Hospice.  Notified Dr. KUHN

## 2022-09-26 NOTE — PLAN OF CARE
Called tri Huff for [facility] and asked her if they would re-consider admitting pt and explained issue w/ PEG/Hospice, vs. Pleasure feeds, etc.  She is checking w/ Administration and will let me know if they could take pt and have fly sign waiver, since pt is aspiration and does not want a PEG, and fly not ready for hospice to be able to take him on SNF>

## 2022-09-26 NOTE — PT/OT/SLP PROGRESS
Physical Therapy Treatment    Patient Name:  Blaze Turner   MRN:  [id number]    Recommendations:     Discharge Recommendations:  nursing facility, skilled   Discharge Equipment Recommendations:     Barriers to discharge:  medical condition    Assessment:     Blaze Turner is a 89 y.o. male admitted with a medical diagnosis of Closed fracture of right hip.  He presents with the following impairments/functional limitations:  weakness, impaired endurance, gait instability, impaired balance, pain, impaired cognition, decreased safety awareness.    Pt agreeable to attempt mobility again this afternoon. He did well gatting up to sitting from supine, required min-CGA with extended time given. He stood with max A and transferred to chair. He stood again and ambulated 15' with RW and max A.     Rehab Prognosis: Fair; patient would benefit from acute skilled PT services to address these deficits and reach maximum level of function.    Recent Surgery: Procedure(s) (LRB):  ARTHROPLASTY, HIP REPLACEMENT (Endo Hip) (Right) 9 Days Post-Op    Plan:     During this hospitalization, patient to be seen daily to address the identified rehab impairments via therapeutic activities, therapeutic exercises and progress toward the following goals:    Plan of Care Expires:  10/17/22    Subjective     Chief Complaint: pain, fatigue  Patient/Family Comments/goals: to return to PLOF.  Pain/Comfort:         Objective:     Communicated with nurse prior to session.  Patient found HOB elevated with peripheral IV upon PT entry to room.     General Precautions: Standard, fall   Orthopedic Precautions:RLE partial weight bearing   Braces:    Respiratory Status: Room air     Functional Mobility:  Bed Mobility:     Supine to Sit: minimum assistance  Transfers:     Sit to Stand:  maximal assistance with rolling walker  Gait: 15' with max A and RW  Balance: CGA in unsupported sitting, max A in standing      AM-PAC 6 CLICK MOBILITY           Therapeutic Activities and Exercises  See above    Patient left HOB elevated with call button in reach and family present..    GOALS:   Multidisciplinary Problems       Physical Therapy Goals          Problem: Physical Therapy    Goal Priority Disciplines Outcome Goal Variances Interventions   Physical Therapy Goal     PT, PT/OT Ongoing, Progressing     Description: Goals to be met by: 10/17/22     Patient will increase functional independence with mobility by performin. Supine to sit with MInimal Assistance  2. Sit to stand transfer with Minimal Assistance  3. Gait  x 150 feet with Contact Guard Assistance using Rolling Walker.                          Time Tracking:     PT Received On: 22  PT Start Time: 1530     PT Stop Time: 1550  PT Total Time (min): 20 min     Billable Minutes: Therapeutic Activity 20    Treatment Type: Treatment  PT/PTA: PTA           2022

## 2022-09-26 NOTE — PROGRESS NOTES
"Speech Language Pathology  Treatment    Blaze Turner   MRN: [id number]   Admitting Diagnosis: Closed fracture of right hip    Diet recommendations: ST recommended NPO. However, Pt's wishes are against a PEG. A diet for puree was ordered, for the Pt, (for nutritional purposes)by MD/nursing.      SLP Treatment Date: (P) 09/26/22  Speech Start Time: (P) 1240     Speech Stop Time: (P) 1340     Speech Total (min): (P) 60 min       TREATMENT BILLABLE MINUTES:  Treatment Swallowing Dysfunction 60    Has the patient been evaluated by SLP for swallowing? : Yes  Keep patient NPO?: Yes   General Precautions: Standard, aspiration          Subjective:  Pt was inconsistently alert but would wake via verbal/tactile stimulation.        Objective:   Skilled intervention focused on Pt family edu and trials of TL to assess Pt's safe PO tolerance.      Assessment:  Blaze Turner is a 89 y.o. male with a medical diagnosis of Closed fracture of right hip and presents with Dysphagia.     Pt's son-in-law reported/read all of his documentation from Pt's PO intake (I.e., how much he ate and what happened w/each consistency). Pt's son-in-law reported there were overt s/s of aspiration w/puree and TL. Pt's son-in-law reported that the Pt reported (before this hospitalization) that he wanted to be DNR, did not want a PEG, and if he was ever at a point not alert and stuck in bed for the family to leave him alone and let him go. Pt's son-in-law reported that the Pt reported a couple of times (during this acute stay), "Ya'll are supposed to leave me alone."     Pt's son edu on safe swallow strategies (I.e., small bites/sips, if Pt is gurgly cue the Pt to cough, make sure the person's voice/respiration is clear prior to another bite/sip, do not provide any bite/sips if Pt has ongoing coughing or his voice/respiration is gurgling). Pt's son verbalized agreement. ST trialed liquids to assess possible safest liquid viscosity. TL sips " presented multiple gulping swallows, immediate gurgling,  coughing,max cues to cough hard, and required suctioning.   Mildly thick (nectar thick) presented less multiple swallows and immediate gurgling respiration/vocal quality. Delayed coughing presented and ST provided max cues to cough hard until he was able to clear secretions that required suctioning. Moderate thick liquids (honey) presented multiple swallows and an immediate gurgly respiration/vocal quality. Delayed coughing presented and ST provided max cues to cough hard until he was able to clear secretions.     Pt's son-in-law and nursing edu that Pt is still at severe risk of aspiration on pleasure feeds. Son-in-law and nursing edu that ST was not going to recommend any thickened liquids b/c he still presented overt s/s of aspiration and thinner liquids are easier to cough/expectorate compared to thicker substances. Son-in-law was edu that H2O is also slightly safer to aspirate due to the viscosity and neutral PH.  Son-in-law and Pt's children are still unsure of d/c plans. Nursing edu on tx session.     Discharge recommendations: Discharge Facility/Level of Care Needs:  (Unknowns)     Goals:   Multidisciplinary Problems       SLP Goals          Problem: SLP    Goal Priority Disciplines Outcome   SLP Goal     SLP    Description: Pt will remain awake for at least 30 min, to safely tolerate therapeutic PO trials.     Pt will tolerate safest and least restrictive diet w/o overt s/s of aspiration or Dysphagia                            Plan:   Patient to be seen Therapy Frequency: (P) 3 x/week   SLP Follow-up?: (P) Yes  SLP - Next Visit Date: (P) 09/27/22 09/26/2022

## 2022-09-26 NOTE — PLAN OF CARE
Problem: Infection  Goal: Absence of Infection Signs and Symptoms  Outcome: Ongoing, Progressing     Problem: Adult Inpatient Plan of Care  Goal: Plan of Care Review  Outcome: Ongoing, Progressing  Goal: Patient-Specific Goal (Individualized)  Outcome: Ongoing, Progressing  Goal: Absence of Hospital-Acquired Illness or Injury  Outcome: Ongoing, Progressing  Goal: Optimal Comfort and Wellbeing  Outcome: Ongoing, Progressing  Goal: Readiness for Transition of Care  Outcome: Ongoing, Progressing     Problem: Skin Injury Risk Increased  Goal: Skin Health and Integrity  Outcome: Ongoing, Progressing     Problem: Fall Injury Risk  Goal: Absence of Fall and Fall-Related Injury  Outcome: Ongoing, Progressing     Problem: Balance Impairment (Functional Deficit)  Goal: Improved Balance and Postural Control  Outcome: Ongoing, Progressing     Problem: Muscle Strength Impairment (Functional Deficit)  Goal: Improved Muscle Strength  Outcome: Ongoing, Progressing     Problem: Range of Motion Impairment (Functional Deficit)  Goal: Optimal Range of Motion  Outcome: Ongoing, Progressing

## 2022-09-26 NOTE — PLAN OF CARE
Call placed to Marielle at [facility] to see if they would take a pt w/ pleasure feeds, not on hospice, if he is aspirating.  Waiting for a call back.  Pt may benefit from an LTAC stay if [facility] is not ready for hospice.  Nurse Sakina danielle call to Dr. BONILLA

## 2022-09-26 NOTE — PROGRESS NOTES
[facility] Medicine  Progress Note    Patient Name: Blaze Turner  MRN: [id number]  Patient Class: IP- Inpatient   Admission Date: 9/16/2022  Length of Stay: 10 days  Attending Physician: Gus Hewitt MD  Primary Care Provider: Gaudencio Munguia MD        Subjective:     Principal Problem:Closed fracture of right hip        HPI:  89-year-old male with hx Dementia,  HTN, Trigeminal neuralgia, Afib, CKD stage 3 baseline Cr 1.5, brought in by his son after fall at home patient states he could not bear weight he was complaining of significant right hip pain so the son brought him to the emergency department.  No LOC, no chest pain, no F/C, no chest pain per daughter. PT takesTrileptal 150 mg daily per daughter. Pt has developed rash for past 5 days per daughter who stays with him. Pt was scratching and agitated he was give Ativan in Ed, now sleeping. Daughter at bedside. Pt is DNR per daughter          Overview/Hospital Course:    9/18/22  Pt has remained lethargic and unarousable since surgery yesterday. Seem to attempt to wake up and mumbles with rough stimulation. Labs were ordered but not drawn this morning. Labs reordered and currently pending.    9/19/22  Upon entering room, son was holding emesis basin for pt as he was sitting up reclined leaning over to right with audible gurgling and spitting up a bit; no real vomitus. He was also coughing as well but not forceful enough to be effective. Son states pt has been awake and alert this morning. He ate a little breakfast and had just eaten some muffin f/b water. Son states RT then came in and had pt use IS and that's when he thinks he sucked in some food and began gagging and coughing. RT states pt had gurgling noises before IS attempted and wasn't able to perform d/t incoordination. RT then deep suctioned using Yonkuer and pt cleared.    09/20/2022.   patient can be somnolent.  Family stated that episodes of emesis  at home and questionable choking with liquids.  This has been happened several times at home   Continue to monitor patient closely  Will have speech to evaluate to deter will be the safest food take by mouth.  Discussed case with family members and eventually they are not recommending any PEG tube placement at this time   Will work with  toward a skilled nursing facility treatment.  09/21/2022.    Patient continues to be somnolent.    Speech evaluation persistent with aspirations not good swelling.  Chest x-ray confirms patient aspirates   Keep patient NPO   Hold IV fluid since patient has been on Lasix in the past questionable history of congestion CHF  Will check proBNP in the morning repeat chest x-ray   Start patient on Zosyn for aspiration pneumonia.    Discussed case with family and .  Most likely this will be an LTAC case.  Patient is do not resuscitate code status her patient's and family member.    02/22/2022.      Patient more awake alert today.  Follows few commands.  Still anxious and confused.    Discussed case with family members.    Will start patient on low-dose IV fluid.  Follow up with speech.    Regardless of outcome family members do not want patient to have a PEG.      09/23/2022.      No significant changes compared to yesterday.    Discussed with family plan.  Since no need for PEG placement per patient's request we were not going to have a swallow study done and modified barium.    will try to treat patient with thickened liquids as tolerated.  Family are willing to consider alternative medical treatment including comfort measures.  Will continue IV antibiotics   continue nebulizer treatment  Follow-up the next 24-48 hours.    09/24/2022.      Patient awake.  Alert follows few commands.  Still confused overall.  Family at bedside  Since not eating for a while family has advised to start something p.o. despite patient risk of aspiration.    They do not advise any  artificial feeding including PPN or PEG tube feeding.    Continue gentle hydration IV.  Thickened liquid and pureed diet.  Follow up in the morning   Continue IV antibiotic.    09/25/2022  Patient doing the same. Min PO yesterday. Still episodes of aspiration.   Will continue IV antibiotic and dc in am.   Family request no artificial feeding at this point.     09/26/2022.    Patient more awake and alert today.    Continue current hospital treatment.  PT OT for the hip surgery.    Discussed with family and  disposition.  Patient is DNR but family not agreeable to hospice at this point      Interval History:  Patient unable to provide much information but overall family said the patient has been stable.  Episodes of cough with feeding but he is able to eat more than 70% of his meals at lunchtime.    Review of Systems   Reason unable to perform ROS: Patient able to provide much information secondary to his cognitive decline.   Objective:     Vital Signs (Most Recent):  Temp: 98.4 °F (36.9 °C) (09/26/22 1104)  Pulse: 84 (09/26/22 1104)  Resp: 20 (09/26/22 0400)  BP: 105/64 (09/26/22 1104)  SpO2: (!) 93 % (09/26/22 1104)   Vital Signs (24h Range):  Temp:  [97.7 °F (36.5 °C)-99.5 °F (37.5 °C)] 98.4 °F (36.9 °C)  Pulse:  [84-95] 84  Resp:  [20-26] 20  SpO2:  [92 %-97 %] 93 %  BP: (105-139)/(64-77) 105/64     Weight: 67.6 kg (149 lb 0.5 oz)  Body mass index is 21.38 kg/m².    Intake/Output Summary (Last 24 hours) at 9/26/2022 1444  Last data filed at 9/26/2022 1200  Gross per 24 hour   Intake 855 ml   Output --   Net 855 ml      Physical Exam  Constitutional:       General: He is not in acute distress.     Appearance: He is not ill-appearing, toxic-appearing or diaphoretic.      Comments: Patient is alert, resting weak frail but not distressed.   HENT:      Head: Normocephalic.      Nose: Nose normal.      Mouth/Throat:      Mouth: Mucous membranes are moist.   Eyes:      Extraocular Movements: Extraocular  movements intact.      Pupils: Pupils are equal, round, and reactive to light.   Cardiovascular:      Rate and Rhythm: Normal rate.      Heart sounds: Murmur heard.   Pulmonary:      Breath sounds: Rhonchi present.      Comments: Decreased air movement some rhonchi but overall patient has good gag reflex.  Abdominal:      General: Abdomen is flat. Bowel sounds are normal.      Palpations: Abdomen is soft.   Musculoskeletal:         General: Swelling present.      Cervical back: Normal range of motion.      Comments: Right hip post surgical intervention intact.   Skin:     Capillary Refill: Capillary refill takes less than 2 seconds.      Comments: Right hip area appears to be improving.  No infection noticed.   Neurological:      General: No focal deficit present.      Mental Status: He is alert. Mental status is at baseline.      Cranial Nerves: Cranial nerves 2-12 are intact.   Psychiatric:         Mood and Affect: Mood normal.       Significant Labs: All pertinent labs within the past 24 hours have been reviewed.  Recent Lab Results       None            Significant Imaging: I have reviewed all pertinent imaging results/findings within the past 24 hours.      Assessment/Plan:      * Closed fracture of right hip  Continue with postop order.  Follow up with PT OT.      Aspiration pneumonitis  Improving.    No need for further antibiotic.    Continue with speech therapy   Pureed diet thickened liquid.    Family against any other alternative of feeding at this time.  Agree with family members that a PEG tube I would be too much at this point since patient is awake alert following commands and wants to enjoy food      Encephalopathy, metabolic  This is a combination of patient's baseline and infection.      Chronic pulmonary aspiration  Per family request.  Start something by mouth despite patient having risk of aspiration.    Any other alternative feeding artificial is not recommended by patient's  wishes.      Paroxysmal A-fib  Will use Cardizem IV push as needed for elevated heart rate more than 110.      DNR (do not resuscitate)  Patient and family have wished to make as no other their wishes are for to do not resuscitate patient.  Patient agrees with decision      Malnutrition of moderate degree  Nutrition consulted. Most recent weight and BMI monitored-                         Measurements:  Wt Readings from Last 1 Encounters:   09/16/22 67.6 kg (149 lb)   Body mass index is 21.38 kg/m².    Recommendations:      Patient has been screened and assessed by RD. RD will follow patient.      Dementia  Continue supportive care      Rash  Likely due to meds, ? Trileptal, may apply cortisol cream PRN      VTE Risk Mitigation (From admission, onward)         Ordered     enoxaparin injection 30 mg  Daily         09/16/22 2307     IP VTE HIGH RISK PATIENT  Once         09/16/22 2307     Place EUGENIA hose  Until discontinued         09/16/22 2307                Discharge Planning   LEON:      Code Status: DNR   Is the patient medically ready for discharge?:     Reason for patient still in hospital (select all that apply): Pending disposition           From my point of view patient is stable to go to skilled nursing facility.    He is able to tolerate by mouth.  Episodes of coughing but there is no choking.    He has mild aspiration but that is chronic in nature and should not preclude him for continue treatment to the next set of care.          Alok Murrieta MD  Department of Hospital Medicine   [facility]

## 2022-09-26 NOTE — SUBJECTIVE & OBJECTIVE
Interval History:  Patient unable to provide much information but overall family said the patient has been stable.  Episodes of cough with feeding but he is able to eat more than 70% of his meals at lunchtime.    Review of Systems   Reason unable to perform ROS: Patient able to provide much information secondary to his cognitive decline.   Objective:     Vital Signs (Most Recent):  Temp: 98.4 °F (36.9 °C) (09/26/22 1104)  Pulse: 84 (09/26/22 1104)  Resp: 20 (09/26/22 0400)  BP: 105/64 (09/26/22 1104)  SpO2: (!) 93 % (09/26/22 1104)   Vital Signs (24h Range):  Temp:  [97.7 °F (36.5 °C)-99.5 °F (37.5 °C)] 98.4 °F (36.9 °C)  Pulse:  [84-95] 84  Resp:  [20-26] 20  SpO2:  [92 %-97 %] 93 %  BP: (105-139)/(64-77) 105/64     Weight: 67.6 kg (149 lb 0.5 oz)  Body mass index is 21.38 kg/m².    Intake/Output Summary (Last 24 hours) at 9/26/2022 1444  Last data filed at 9/26/2022 1200  Gross per 24 hour   Intake 855 ml   Output --   Net 855 ml      Physical Exam  Constitutional:       General: He is not in acute distress.     Appearance: He is not ill-appearing, toxic-appearing or diaphoretic.      Comments: Patient is alert, resting weak frail but not distressed.   HENT:      Head: Normocephalic.      Nose: Nose normal.      Mouth/Throat:      Mouth: Mucous membranes are moist.   Eyes:      Extraocular Movements: Extraocular movements intact.      Pupils: Pupils are equal, round, and reactive to light.   Cardiovascular:      Rate and Rhythm: Normal rate.      Heart sounds: Murmur heard.   Pulmonary:      Breath sounds: Rhonchi present.      Comments: Decreased air movement some rhonchi but overall patient has good gag reflex.  Abdominal:      General: Abdomen is flat. Bowel sounds are normal.      Palpations: Abdomen is soft.   Musculoskeletal:         General: Swelling present.      Cervical back: Normal range of motion.      Comments: Right hip post surgical intervention intact.   Skin:     Capillary Refill: Capillary refill  takes less than 2 seconds.      Comments: Right hip area appears to be improving.  No infection noticed.   Neurological:      General: No focal deficit present.      Mental Status: He is alert. Mental status is at baseline.      Cranial Nerves: Cranial nerves 2-12 are intact.   Psychiatric:         Mood and Affect: Mood normal.       Significant Labs: All pertinent labs within the past 24 hours have been reviewed.  Recent Lab Results       None            Significant Imaging: I have reviewed all pertinent imaging results/findings within the past 24 hours.

## 2022-09-26 NOTE — PROGRESS NOTES
[facility]  Adult Nutrition  Progress Note    SUMMARY       Recommendations    Recommendation/Intervention:     Rec'd continue Pureed diet as tolerated.    May consider ONS of ok with SLP.   Monitor intake, tolerance, weight, and labs.      RD following and available as needed.  Thank you.    Goals: Meet >/= 75% EN by d/c.    Nutrition Goal Status: new    Communication of RD Recs: discussed on rounds    Assessment and Plan    PES: Inadequate energy intake related to Dysphagia as evidenced by Pt NPO. Not safe for po intake. (new)  Service: Nutrition       Malnutrition Assessment    Malnutrition in the context of chronic illness    Degree of Malnutrition:  Severe Malnutrition  Energy Intake:  </= 75% of estimated energy requirement for >/= 1 month  Interpretation of Weight Loss:  unable to obtain  Body Fat: severe depletion  Area of Body Fat Loss:  orbital region , upper arm region - triceps / biceps and thoracic and lumbar region - ribs, lower back, midaxillary line  Muscle Mass Loss:  severe depletion  Area of Muscle Mass Loss: temple region - temporalis muscle, clavicle bone region - pectoralis major, deltoid, trapezius muscles, clavicle and acromion bone region - deltoid muscle and scapular bone region - trapezius, supraspinus, infraspinus muscles  Fluid Accumulation:  unable to obtain  Edema: unable to obtain  Reduced  Strength:  not applicable    A minimum of two characteristics is recommended for diagnosis of either severe or non-severe malnutrition.            Reason for Assessment    Reason For Assessment: RD follow up.   Diagnosis: other (see comments) (Closed Fracture of Right Hip.)  Relevant Medical History: Closed fracture of right hip.        Aspiration pneumonitis  Continue treatment.  morning continue Zosyn           Encephalopathy, metabolic  This is a combination of patient's baseline and infection.        Chronic pulmonary aspiration  For family peg tube not  an option since patient's wishes are not to be fed artificially  Since no need for PEG will discontinue modified barium gregory  will discuss with nutritionist to find the safest possible p.o. intake.    will follow up with family and patient        Paroxysmal A-fib  Will use Cardizem IV push as needed for elevated heart rate more than 110.        DNR (do not resuscitate)  Patient and family have wished to make as no other their wishes are for to do not resuscitate patient.  Patient agrees with decision        Malnutrition of moderate degree.    General Information Comments:     9/26:  NPO status was lifted and pt placed on pureed diet on 9/24.  Noted pt with 50% recorded po intake today.  CM states she is working on placement.  Will continue to monitor during stay.      9/23: Pt with severe muscle and fat wasting. Noted pt does not wish to be fed artifically and does not want a PEG tube.  Pt recently failed MBSS and remains NPO.  Second MBSS cancled today.  Discussed with SLP the possibility of pt's being cleared for po intake (maybe thickened liquids) and if so modifications needed for safest p.o. intake. Observed SLP conduct BSE, pleasure feeds with ice chips today.  Pt was able to follow swallow commands and SLP reports comprehension better today; however, observed pt with inconsistent alertness as SLP  had to constantly tell pt to open his eyes during feeds. Pt also had  having significant coughing episodes with ice chips and requiring suctioning often. SLP recommends pt remain NPO over the weekend and will reevaluate swallow on Monday. RD available to provide parenteral recs if medically appropriate.  Will continue to monitor during stay.    Nutrition Risk Screen    Nutrition Risk Screen: dysphagia or difficulty swallowing    Nutrition/Diet History    Food Allergies: NKFA  Factors Affecting Nutritional Intake: difficulty/impaired swallowing, impaired cognitive status/motor control, inability to feed  "self    Anthropometrics    Temp: 99.6 °F (37.6 °C)  Height: 5' 10" (177.8 cm)  Height (inches): 70 in  Weight Method: Bed Scale  Weight: 67.6 kg (149 lb 0.5 oz)  Weight (lb): 149.03 lb  Ideal Body Weight (IBW), Male: 166 lb  % Ideal Body Weight, Male (lb): 89.78 %  BMI (Calculated): 21.4  BMI Grade: 18.5-24.9 - normal       Lab/Procedures/Meds    Pertinent Labs Reviewed: reviewed  Pertinent Labs Comments: 9/23: No new labs.  Pertinent Medications Reviewed: reviewed  Pertinent Medications Comments: D5 and 0.45% NaCl; Lovenox.    Physical Findings/Assessment         Estimated/Assessed Needs    Weight Used For Calorie Calculations: 68 kg (149 lb 14.6 oz)  Energy Calorie Requirements (kcal): 1700 to 2000 kcals/kg/ABW  Energy Need Method: Kcal/kg  Protein Requirements: 90 to 115 g/kg/IBW  Weight Used For Protein Calculations: 75.3 kg (166 lb)  Fluid Requirements (mL): 1700 to 2000 mL/day (1mL/kcal)     RDA Method (mL): 1700         Nutrition Prescription Ordered    Current Diet Order: NPO  Nutrition Order Comments: SLP recommends pt remain NPO as pt is not safe for po intake at this time following pleasure feeds with ice chips today. ST will reassess on Monday.  Pt does not want PEG. RD available to provide parenteral recs if medically appropriate.    Evaluation of Received Nutrient/Fluid Intake    Energy Calories Required: not meeting needs  Protein Required: not meeting needs  Fluid Required: not meeting needs  Tolerance: not tolerating  % Intake of Estimated Energy Needs: 25 - 50 %  % Meal Intake: 25 - 50 %    Nutrition Risk    Level of Risk/Frequency of Follow-up: high     Monitor and Evaluation    Food and Nutrient Intake: energy intake, food and beverage intake  Food and Nutrient Adminstration: diet order  Anthropometric Measurements: weight, weight change  Biochemical Data, Medical Tests and Procedures: gastrointestinal profile, electrolyte and renal panel, inflammatory profile, glucose/endocrine profile, lipid " profile     Nutrition Follow-Up    RD Follow-up?: Yes

## 2022-09-26 NOTE — PT/OT/SLP PROGRESS
Physical Therapy Treatment    Patient Name:  Blaze Turner   MRN:  [id number]    Recommendations:     Discharge Recommendations:  nursing facility, skilled   Discharge Equipment Recommendations:     Barriers to discharge:  physical assistance level family can provide,     Assessment:     Blaze Turner is a 89 y.o. male admitted with a medical diagnosis of Closed fracture of right hip.  He presents with the following impairments/functional limitations:  weakness, impaired endurance, gait instability, impaired balance, pain, impaired cognition, decreased safety awareness.    Pt in bed, easy to awake and agreeable to PT. He got up to EOB with mod A, able to sit with CGA. Pt able to stand with max A and was able to ambulate 3 trials of about 5'. He required mod-max A with RW for gait with assistance to weight shift and manage AD. Pt has difficulty with mobility due to cognition but is making slow progress.    Rehab Prognosis: Fair; patient would benefit from acute skilled PT services to address these deficits and reach maximum level of function.    Recent Surgery: Procedure(s) (LRB):  ARTHROPLASTY, HIP REPLACEMENT (Endo Hip) (Right) 9 Days Post-Op    Plan:     During this hospitalization, patient to be seen daily to address the identified rehab impairments via therapeutic activities, therapeutic exercises and progress toward the following goals:    Plan of Care Expires:  10/17/22    Subjective     Chief Complaint: pain, fatigue  Patient/Family Comments/goals: to return to PLOF.  Pain/Comfort:         Objective:     Communicated with nurse prior to session.  Patient found HOB elevated with peripheral IV upon PT entry to room.     General Precautions: Standard, fall   Orthopedic Precautions:RLE partial weight bearing   Braces:    Respiratory Status: Room air     Functional Mobility:  Bed Mobility:     Supine to Sit: moderate assistance and maximal assistance  Transfers:     Sit to Stand:  maximal assistance with  rolling walker  Gait: 3 x 4-5' with max A and RW  Balance: CGA in unsupported sitting, max A in standing      AM-PAC 6 CLICK MOBILITY          Therapeutic Activities and Exercises  See above    Patient left HOB elevated with call button in reach and family present..    GOALS:   Multidisciplinary Problems       Physical Therapy Goals          Problem: Physical Therapy    Goal Priority Disciplines Outcome Goal Variances Interventions   Physical Therapy Goal     PT, PT/OT Ongoing, Progressing     Description: Goals to be met by: 10/17/22     Patient will increase functional independence with mobility by performin. Supine to sit with MInimal Assistance  2. Sit to stand transfer with Minimal Assistance  3. Gait  x 150 feet with Contact Guard Assistance using Rolling Walker.                          Time Tracking:     PT Received On: 22  PT Start Time: 0950     PT Stop Time: 1010  PT Total Time (min): 20 min     Billable Minutes: Therapeutic Activity 20    Treatment Type: Treatment  PT/PTA: PTA           2022

## 2022-09-26 NOTE — PLAN OF CARE
[facility] has accepted pt for tomorrow for skilled nursing.  Informed fly in room.  Informed Dr. MESA and nurse.

## 2022-09-26 NOTE — ASSESSMENT & PLAN NOTE
Improving.    No need for further antibiotic.    Continue with speech therapy   Pureed diet thickened liquid.    Family against any other alternative of feeding at this time.  Agree with family members that a PEG tube I would be too much at this point since patient is awake alert following commands and wants to enjoy food

## 2022-09-27 PROCEDURE — 25000003 PHARM REV CODE 250: Performed by: INTERNAL MEDICINE

## 2022-09-27 PROCEDURE — 92526 ORAL FUNCTION THERAPY: CPT

## 2022-09-27 PROCEDURE — 11000001 HC ACUTE MED/SURG PRIVATE ROOM

## 2022-09-27 PROCEDURE — 99900035 HC TECH TIME PER 15 MIN (STAT)

## 2022-09-27 PROCEDURE — 25000003 PHARM REV CODE 250: Performed by: SPECIALIST

## 2022-09-27 PROCEDURE — 97530 THERAPEUTIC ACTIVITIES: CPT

## 2022-09-27 PROCEDURE — 94761 N-INVAS EAR/PLS OXIMETRY MLT: CPT

## 2022-09-27 PROCEDURE — 27000221 HC OXYGEN, UP TO 24 HOURS

## 2022-09-27 PROCEDURE — S5010 5% DEXTROSE AND 0.45% SALINE: HCPCS | Performed by: EMERGENCY MEDICINE

## 2022-09-27 PROCEDURE — 27000207 HC ISOLATION

## 2022-09-27 PROCEDURE — 63600175 PHARM REV CODE 636 W HCPCS: Performed by: INTERNAL MEDICINE

## 2022-09-27 PROCEDURE — 25000003 PHARM REV CODE 250: Performed by: EMERGENCY MEDICINE

## 2022-09-27 RX ADMIN — DEXTROSE AND SODIUM CHLORIDE: 5; 450 INJECTION, SOLUTION INTRAVENOUS at 06:09

## 2022-09-27 RX ADMIN — ENOXAPARIN SODIUM 30 MG: 30 INJECTION SUBCUTANEOUS at 04:09

## 2022-09-27 RX ADMIN — FAMOTIDINE 20 MG: 10 INJECTION, SOLUTION INTRAVENOUS at 08:09

## 2022-09-27 RX ADMIN — OXCARBAZEPINE 150 MG: 150 TABLET, FILM COATED ORAL at 09:09

## 2022-09-27 RX ADMIN — Medication 6 MG: at 09:09

## 2022-09-27 RX ADMIN — POLYETHYLENE GLYCOL 3350 17 G: 17 POWDER, FOR SOLUTION ORAL at 09:09

## 2022-09-27 RX ADMIN — ACETAMINOPHEN 325MG 650 MG: 325 TABLET ORAL at 09:09

## 2022-09-27 RX ADMIN — FAMOTIDINE 20 MG: 10 INJECTION, SOLUTION INTRAVENOUS at 09:09

## 2022-09-27 RX ADMIN — HYDROCORTISONE: 1 CREAM TOPICAL at 09:09

## 2022-09-27 NOTE — PLAN OF CARE
[facility] called and informed me that pt cannot go to their facility before 10 days from now d/t COVID positive.  Informed Dr. Hewitt.

## 2022-09-27 NOTE — PROGRESS NOTES
"Speech Language Pathology  Treatment    Blaze Turner   MRN: [id number]   Admitting Diagnosis: Closed fracture of right hip    Diet recommendations: Solid Diet Level: NPO  Liquid Diet Level: NPO       SLP Treatment Date: 09/27/22  Speech Start Time: (P) 1510     Speech Stop Time: (P) 1550     Speech Total (min): (P) 40 min       TREATMENT BILLABLE MINUTES:  Treatment Swallowing Dysfunction 40    Has the patient been evaluated by SLP for swallowing? : Yes  Keep patient NPO?: (P)  (ST believes the Pt is unsafe for PO intake but a PEG is against his wishes.)   General Precautions: Standard, (P) aspiration          Subjective:  Pt was initially alert in bed w/oral secretions draining from oral cavity. However, Pt started to fall asleep. During his time of alertness, Pt's voice was clear intelligible and Pt reported, "It's time for me to go home." ST asked where his home was located, and Pt reported, "Not too far from here." Pt was smiling and very sweet when he was awake.       Objective:    Skilled intervention focused on utilization of therapeutic PO trials (to assess any changes w/swallow function) w/edu to minimize risk of aspiration.     Assessment:  Blaze Turner is a 89 y.o. male with a medical diagnosis of Closed fracture of right hip and presents with severe Dysphagia.  Pt consumed 2oz of thick puree w/multiple swallows, gurgly vocal quality, and delayed coughing. Pt's coughing presented inconsistent productive ability. ST had to suction Pt's oral cavity when he was able to produce secretions. Pt consumed x1 sip of TL w/multiple swallows, gurgly vocal quality when he phonated, and delayed coughing. Pt unable to participate in any Dysphagia exercises due to comprehension/Dementia.   Pt is at severe risk for aspiration w/PO intake, but a PEG goes against his wishes.   Discharge recommendations: Discharge Facility/Level of Care Needs:  (Unknowns)     Goals:   Multidisciplinary Problems       SLP Goals  "         Problem: SLP    Goal Priority Disciplines Outcome   SLP Goal     SLP    Description: Pt will remain awake for at least 30 min, to safely tolerate therapeutic PO trials.     Pt will tolerate safest and least restrictive diet w/o overt s/s of aspiration or Dysphagia                            Plan:   Patient to be seen Therapy Frequency: (P) 3 x/week   SLP Follow-up?: (P) Yes  SLP - Next Visit Date: (P) 09/29/22 09/27/2022

## 2022-09-27 NOTE — PT/OT/SLP PROGRESS
Physical Therapy Treatment    Patient Name:  Blaze Turner   MRN:  [id number]    Recommendations:     Discharge Recommendations:  nursing facility, skilled   Discharge Equipment Recommendations:     Barriers to discharge:  medical condition    Assessment:     Blaze Turner is a 89 y.o. male admitted with a medical diagnosis of Closed fracture of right hip.  He presents with the following impairments/functional limitations:  weakness, impaired endurance, impaired functional mobility, impaired self care skills, gait instability, impaired balance, impaired cognition, decreased lower extremity function, decreased safety awareness, pain, decreased ROM.    Pt in bed, eyes closed and very confused. Patient was saturated and dirty and needed to be cleaned. Therapists initiated rolling and bed mobility for patient to be cleaned, but patient was very resistant to movement. He was Max A x 2 to be cleaned and was very combative. Due to patients varying levels of confusion, fatigue, and participation, he is not appropriate to be seen BID and will instead be seen daily for mobility.     Rehab Prognosis: Fair; patient would benefit from acute skilled PT services to address these deficits and reach maximum level of function.    Recent Surgery: Procedure(s) (LRB):  ARTHROPLASTY, HIP REPLACEMENT (Endo Hip) (Right) 10 Days Post-Op    Plan:     During this hospitalization, patient to be seen daily to address the identified rehab impairments via therapeutic activities, therapeutic exercises and progress toward the following goals:    Plan of Care Expires:  10/17/22    Subjective     Chief Complaint: pain, fatigue  Patient/Family Comments/goals: to return to PLOF.  Pain/Comfort:         Objective:     Communicated with nurse prior to session.  Patient found HOB elevated with   upon PT entry to room.     General Precautions: Standard, fall   Orthopedic Precautions:RLE partial weight bearing   Braces:    Respiratory Status: Room  air     Functional Mobility:  Bed Mobility:     Rolling Left:  maximal assistance and of 2 persons  Rolling Right: maximal assistance and of 2 persons  Scooting: maximal assistance of 2 people      AM-PAC 6 CLICK MOBILITY          Therapeutic Activities and Exercises  See above    Patient left HOB elevated with call button in reach and family present..    GOALS:   Multidisciplinary Problems       Physical Therapy Goals          Problem: Physical Therapy    Goal Priority Disciplines Outcome Goal Variances Interventions   Physical Therapy Goal     PT, PT/OT Ongoing, Progressing     Description: Goals to be met by: 10/17/22     Patient will increase functional independence with mobility by performin. Supine to sit with MInimal Assistance  2. Sit to stand transfer with Minimal Assistance  3. Gait  x 150 feet with Contact Guard Assistance using Rolling Walker.                          Time Tracking:     PT Received On: 22  PT Start Time: 920    PT Stop Time: 935  PT Total Time (min): 15 min     Billable Minutes: Therapeutic Activity 15    Treatment Type: Evaluation  PT/PTA: PT           2022

## 2022-09-28 VITALS
TEMPERATURE: 98 F | RESPIRATION RATE: 18 BRPM | WEIGHT: 149.06 LBS | BODY MASS INDEX: 21.34 KG/M2 | HEIGHT: 70 IN | HEART RATE: 87 BPM | OXYGEN SATURATION: 99 % | SYSTOLIC BLOOD PRESSURE: 128 MMHG | DIASTOLIC BLOOD PRESSURE: 68 MMHG

## 2022-09-28 DIAGNOSIS — U07.1 COVID-19 VIRUS DETECTED: ICD-10-CM

## 2022-09-28 LAB
ABS NEUT CALC (OHS): 6.35 X10(3)/MCL (ref 2.1–9.2)
ALBUMIN SERPL-MCNC: 2.7 GM/DL (ref 3.4–4.8)
BUN SERPL-MCNC: 10 MG/DL (ref 8.4–25.7)
BURR CELLS (OLG): SLIGHT
CALCIUM SERPL-MCNC: 8.3 MG/DL (ref 8.8–10)
CHLORIDE SERPL-SCNC: 108 MMOL/L (ref 98–111)
CO2 SERPL-SCNC: 23 MMOL/L (ref 23–31)
CREAT SERPL-MCNC: 0.7 MG/DL (ref 0.73–1.18)
EOSINOPHIL NFR BLD MANUAL: 0.18 X10(3)/MCL (ref 0–0.9)
EOSINOPHIL NFR BLD MANUAL: 2 % (ref 0–8)
ERYTHROCYTE [DISTWIDTH] IN BLOOD BY AUTOMATED COUNT: 14.6 % (ref 11.5–17)
GFR SERPLBLD CREATININE-BSD FMLA CKD-EPI: >60 MLS/MIN/1.73/M2
GLUCOSE SERPL-MCNC: 97 MG/DL (ref 75–121)
HCT VFR BLD AUTO: 36.8 % (ref 42–52)
HGB BLD-MCNC: 11.9 GM/DL (ref 14–18)
IMM GRANULOCYTES # BLD AUTO: 0.9 X10(3)/MCL (ref 0–0.04)
IMM GRANULOCYTES NFR BLD AUTO: 9.8 %
LYMPHOCYTES NFR BLD MANUAL: 1.56 X10(3)/MCL
LYMPHOCYTES NFR BLD MANUAL: 17 % (ref 13–40)
MAGNESIUM SERPL-MCNC: 1.9 MG/DL (ref 1.6–2.6)
MCH RBC QN AUTO: 33 PG (ref 27–31)
MCHC RBC AUTO-ENTMCNC: 32.3 MG/DL (ref 33–36)
MCV RBC AUTO: 101.9 FL (ref 80–94)
MONOCYTES NFR BLD MANUAL: 1.1 X10(3)/MCL (ref 0.1–1.3)
MONOCYTES NFR BLD MANUAL: 12 % (ref 2–11)
NEUTROPHILS NFR BLD MANUAL: 62 % (ref 47–80)
NEUTS BAND NFR BLD MANUAL: 7 % (ref 0–11)
OVALOCYTES (OLG): SLIGHT
PHOSPHATE SERPL-MCNC: 2 MG/DL (ref 2.3–4.7)
PLATELET # BLD AUTO: 202 X10(3)/MCL (ref 130–400)
PLATELET # BLD EST: ADEQUATE 10*3/UL
PMV BLD AUTO: 11.5 FL (ref 7.4–10.4)
POIKILOCYTOSIS BLD QL SMEAR: SLIGHT
POTASSIUM SERPL-SCNC: 3.7 MMOL/L (ref 3.5–5.1)
RBC # BLD AUTO: 3.61 X10(6)/MCL (ref 4.7–6.1)
RBC MORPH BLD: ABNORMAL
SODIUM SERPL-SCNC: 139 MMOL/L (ref 132–146)
WBC # SPEC AUTO: 9.2 X10(3)/MCL (ref 4.5–11.5)

## 2022-09-28 PROCEDURE — 25000003 PHARM REV CODE 250: Performed by: INTERNAL MEDICINE

## 2022-09-28 PROCEDURE — 83735 ASSAY OF MAGNESIUM: CPT | Performed by: INTERNAL MEDICINE

## 2022-09-28 PROCEDURE — 25000003 PHARM REV CODE 250: Performed by: EMERGENCY MEDICINE

## 2022-09-28 PROCEDURE — 36415 COLL VENOUS BLD VENIPUNCTURE: CPT | Performed by: INTERNAL MEDICINE

## 2022-09-28 PROCEDURE — S5010 5% DEXTROSE AND 0.45% SALINE: HCPCS | Performed by: EMERGENCY MEDICINE

## 2022-09-28 PROCEDURE — 97530 THERAPEUTIC ACTIVITIES: CPT

## 2022-09-28 PROCEDURE — 94761 N-INVAS EAR/PLS OXIMETRY MLT: CPT

## 2022-09-28 PROCEDURE — 85025 COMPLETE CBC W/AUTO DIFF WBC: CPT | Performed by: INTERNAL MEDICINE

## 2022-09-28 PROCEDURE — 25000003 PHARM REV CODE 250: Performed by: SPECIALIST

## 2022-09-28 PROCEDURE — 80069 RENAL FUNCTION PANEL: CPT | Performed by: INTERNAL MEDICINE

## 2022-09-28 PROCEDURE — 27000221 HC OXYGEN, UP TO 24 HOURS

## 2022-09-28 RX ADMIN — HYDROCORTISONE: 1 CREAM TOPICAL at 09:09

## 2022-09-28 RX ADMIN — DEXTROSE AND SODIUM CHLORIDE: 5; 450 INJECTION, SOLUTION INTRAVENOUS at 06:09

## 2022-09-28 RX ADMIN — POLYETHYLENE GLYCOL 3350 17 G: 17 POWDER, FOR SOLUTION ORAL at 08:09

## 2022-09-28 RX ADMIN — OXCARBAZEPINE 150 MG: 150 TABLET, FILM COATED ORAL at 08:09

## 2022-09-28 RX ADMIN — FAMOTIDINE 20 MG: 10 INJECTION, SOLUTION INTRAVENOUS at 08:09

## 2022-09-28 NOTE — PROGRESS NOTES
[facility] Medicine Progress Note    Patient Name: Blaze Turner  Age: 89 y.o.   Code Status: DNR  MRN: [id number]  Admission Date: 9/16/2022  8:15 PM   Patient Class: IP- Inpatient  Hospital Length of Stay: 11 days  Attending Provider: Gus Hewitt MD  Primary Care Provider: Gaudencio Munguia MD   Chief Complaint:   Chief Complaint   Patient presents with    Fall     Pt fell earlier today, initially c/o knee pain, currently c/o right hip pain. No shortening or rotation noted.     Patient denies any complaints of pain at this time.     Patient is postop day 1 TFN left hip fracture.    Follow-up on hip fracture           SUBJECTIVE:     Follow-up:  Closed fracture of right hip    HPI:  89-year-old male with hx Dementia,  HTN, Trigeminal neuralgia, Afib, CKD stage 3 baseline Cr 1.5, brought in by his son after fall at home patient states he could not bear weight he was complaining of significant right hip pain so the son brought him to the emergency department.  No LOC, no chest pain, no F/C, no chest pain per daughter. PT takesTrileptal 150 mg daily per daughter. Pt has developed rash for past 5 days per daughter who stays with him. Pt was scratching and agitated he was give Ativan in Ed, now sleeping. Daughter at bedside. Pt is DNR per daughter       Hospital Coarse:    9/18/22  Pt has remained lethargic and unarousable since surgery yesterday. Seem to attempt to wake up and mumbles with rough stimulation. Labs were ordered but not drawn this morning. Labs reordered and currently pending.    9/19/22  Upon entering room, son was holding emesis basin for pt as he was sitting up reclined leaning over to right with audible gurgling and spitting up a bit; no real vomitus. He was also coughing as well but not forceful enough to be effective. Son states pt has been awake and alert this morning. He ate a little breakfast and had just eaten some muffin f/b water. Son states RT then came in and had  pt use IS and that's when he thinks he sucked in some food and began gagging and coughing. RT states pt had gurgling noises before IS attempted and wasn't able to perform d/t incoordination. RT then deep suctioned using [place] and pt cleared.    9/20/22.   patient can be somnolent.  Family stated that episodes of emesis at home and questionable choking with liquids.  This has been happened several times at home   Continue to monitor patient closely  Will have speech to evaluate to deter will be the safest food take by mouth.  Discussed case with family members and eventually they are not recommending any PEG tube placement at this time   Will work with  toward a skilled nursing facility treatment.  09/21/2022.    Patient continues to be somnolent.    Speech evaluation persistent with aspirations not good swelling.  Chest x-ray confirms patient aspirates   Keep patient NPO   Hold IV fluid since patient has been on Lasix in the past questionable history of congestion CHF  Will check proBNP in the morning repeat chest x-ray   Start patient on Zosyn for aspiration pneumonia.    Discussed case with family and .  Most likely this will be an LTAC case.  Patient is do not resuscitate code status her patient's and family member.    9/22/22.    Patient more awake alert today.  Follows few commands.  Still anxious and confused.    Discussed case with family members.    Will start patient on low-dose IV fluid.  Follow up with speech.    Regardless of outcome family members do not want patient to have a PEG.      9/23/22.    No significant changes compared to yesterday.    Discussed with family plan.  Since no need for PEG placement per patient's request we were not going to have a swallow study done and modified barium.    will try to treat patient with thickened liquids as tolerated.  Family are willing to consider alternative medical treatment including comfort measures.  Will continue IV antibiotics    continue nebulizer treatment  Follow-up the next 24-48 hours.    9/24/22.    Patient awake.  Alert follows few commands.  Still confused overall.  Family at bedside  Since not eating for a while family has advised to start something p.o. despite patient risk of aspiration.    They do not advise any artificial feeding including PPN or PEG tube feeding.    Continue gentle hydration IV.  Thickened liquid and pureed diet.  Follow up in the morning   Continue IV antibiotic.    9/25/22  Patient doing the same. Min PO yesterday. Still episodes of aspiration.   Will continue IV antibiotic and dc in am.   Family request no artificial feeding at this point.     9/26/22.    Patient more awake and alert today.    Continue current hospital treatment.  PT OT for the hip surgery.    Discussed with family and  disposition.  Patient is DNR but family not agreeable to hospice at this point    9/27/22  No new complaints or acute events. Alert. Pt was +COVID on screening for SNF, now unable to go for 10 more days.         Scheduled Meds:   enoxaparin  30 mg Subcutaneous Daily    famotidine (PF)  20 mg Intravenous Daily    hydrocortisone   Topical (Top) BID    OXcarbazepine  150 mg Oral Daily    polyethylene glycol  17 g Oral Daily     Continuous Infusions:   dextrose 5 % and 0.45 % NaCl 75 mL/hr at 09/27/22 0645     PRN Meds:   acetaminophen    acetaminophen    albuterol-ipratropium    bisacodyL    HYDROcodone-acetaminophen    melatonin    ondansetron    ondansetron    oxyCODONE    sodium chloride 0.9%    sodium chloride 0.9%      Lines/Drains:   Lines/Drains/Airways       Epidural Line  Duration                  Neuraxial Analgesia/Anesthesia Assessment (using dermatomes) Epidural 09/17/22 1049 10 days         Neuraxial Analgesia/Anesthesia Assessment (using dermatomes) Epidural 09/17/22 1049 10 days                      Allergies as of 09/16/2022    (No Known Allergies)         Review of Systems: 10 systems reviewed all  pertinent positives and negatives stated in HPI, otherwise negative.       OBJECTIVE:     Vital Signs (Most Recent)  Temp: 98 °F (36.7 °C) (09/27/22 1942)  Pulse: 104 (09/27/22 2142)  Resp: 20 (09/27/22 2030)  BP: (!) 146/90 (09/27/22 2126)  SpO2: (!) 94 % (09/27/22 2142)    Vital Signs Range (Last 24H):  Temp:  [97.6 °F (36.4 °C)-99.1 °F (37.3 °C)]   Pulse:  []   Resp:  [19-20]   BP: (134-177)/(71-90)   SpO2:  [94 %-98 %]     I & O (Last 24H):  Intake/Output Summary (Last 24 hours) at 9/27/2022 2220  Last data filed at 9/27/2022 1200  Gross per 24 hour   Intake 2590 ml   Output --   Net 2590 ml       Physical Exam  Constitutional:       Appearance: He is well-developed. He is not ill-appearing.   HENT:      Head: Normocephalic and atraumatic.      Nose: Nose normal.      Mouth/Throat:      Mouth: Mucous membranes are moist.      Pharynx: Oropharynx is clear.   Eyes:      Extraocular Movements: Extraocular movements intact.      Conjunctiva/sclera: Conjunctivae normal.   Cardiovascular:      Rate and Rhythm: Normal rate and regular rhythm.      Heart sounds: No murmur heard.  Pulmonary:      Effort: Pulmonary effort is normal.      Breath sounds: Normal breath sounds. No wheezing, rhonchi or rales.   Abdominal:      General: Abdomen is flat. Bowel sounds are normal.      Palpations: Abdomen is soft.   Musculoskeletal:      Right lower leg: No edema.      Left lower leg: No edema.   Skin:     General: Skin is warm and dry.   Neurological:      Mental Status: He is alert.        Recent Labs   Lab 09/21/22  0320 09/22/22  0352   WBC 9.4 9.3   HGB 10.3* 10.1*   HCT 32.1* 32.0*   * 143   .6* 104.6*        No results for input(s): NA, K, CHLORIDE, CO2, BUN, CREATININE, GLUCOSE, MG, PHOS, CALCIUM, ALBUMIN, PROT, BILITOT, ALKPHOS, ALT, AST, CRP, BNP, FERRITIN in the last 48 hours.        ASSESSMENT/PLAN:     Patient Active Problem List    Diagnosis Date Noted    Closed fracture of right hip 09/16/2022     Encephalopathy, metabolic 09/18/2022    Paroxysmal A-fib 09/17/2022    Aspiration pneumonitis 09/21/2022    DNR (do not resuscitate) 09/21/2022    Chronic pulmonary aspiration 09/20/2022    Malnutrition of moderate degree 09/20/2022    Dementia 09/18/2022    Rash 09/17/2022        - cont current  - aspiration risk  - no PEG  - DNR  - dispo pending      VTE Risk Mitigation (From admission, onward)           Ordered     enoxaparin injection 30 mg  Daily         09/16/22 2307     IP VTE HIGH RISK PATIENT  Once         09/16/22 2307     Place EUGENIA hose  Until discontinued         09/16/22 2307                           Gus Hewitt MD  [facility] Medicine   [facility]

## 2022-09-28 NOTE — PLAN OF CARE
Nurse will call report to Jennifer at [facility] and call [facility] to  pt. Packet given to nurse.

## 2022-09-28 NOTE — PLAN OF CARE
D/c info sent to [facility], including COVID positive results and 142, PSRR. Waiting to hear back on who we need to call report to.  Pt placed in will-call w/ [facility], poor trunk control, dementia, combative,hip fx.  Trip to be billed to pt insurance.

## 2022-09-28 NOTE — PT/OT/SLP PROGRESS
Physical Therapy Treatment    Patient Name:  Blaze Turner   MRN:  [id number]    Recommendations:     Discharge Recommendations:  nursing facility, skilled   Discharge Equipment Recommendations:     Barriers to discharge:  medical condition    Assessment:     Blaze Turner is a 89 y.o. male admitted with a medical diagnosis of Closed fracture of right hip.  He presents with the following impairments/functional limitations:  weakness, impaired endurance, gait instability, impaired balance, pain, impaired cognition, decreased safety awareness.    Pt responsive but kept eyes closed throughout session. He required max A for bed mobility, getting to supine to sit, and for balance with sitting at EOB. He tolerated sitting for about 8 minutes, unsafe to transfer to chair.    Rehab Prognosis: Fair; patient would benefit from acute skilled PT services to address these deficits and reach maximum level of function.    Recent Surgery: Procedure(s) (LRB):  ARTHROPLASTY, HIP REPLACEMENT (Endo Hip) (Right) 11 Days Post-Op    Plan:     During this hospitalization, patient to be seen daily to address the identified rehab impairments via therapeutic activities, therapeutic exercises and progress toward the following goals:    Plan of Care Expires:  10/17/22    Subjective     Chief Complaint: pain, fatigue  Patient/Family Comments/goals: to return to PLOF.  Pain/Comfort:         Objective:     Communicated with nurse prior to session.  Patient found HOB elevated with peripheral IV upon PT entry to room.     General Precautions: Standard, fall   Orthopedic Precautions:RLE partial weight bearing   Braces:    Respiratory Status: Room air     Functional Mobility:  Bed Mobility:     Rolling Left:  maximal assistance  Rolling Right: maximal assistance  Scooting: maximal assistance  Supine to Sit: maximal assistance  Balance: max A sitting  at EOB      AM-PAC 6 CLICK MOBILITY          Therapeutic Activities and Exercises  See  above    Patient left HOB elevated with call button in reach and family present..    GOALS:   Multidisciplinary Problems       Physical Therapy Goals          Problem: Physical Therapy    Goal Priority Disciplines Outcome Goal Variances Interventions   Physical Therapy Goal     PT, PT/OT Ongoing, Progressing     Description: Goals to be met by: 10/17/22     Patient will increase functional independence with mobility by performin. Supine to sit with MInimal Assistance  2. Sit to stand transfer with Minimal Assistance  3. Gait  x 150 feet with Contact Guard Assistance using Rolling Walker.                          Time Tracking:     PT Received On: 22  PT Start Time: 930    PT Stop Time: 950  PT Total Time (min): 20 min     Billable Minutes: Therapeutic Activity 20    Treatment Type: Treatment  PT/PTA: PTA           2022

## 2022-09-28 NOTE — NURSING
THIS NURSE SPOKE WITH PATIENT'S DAUGHTER TED AND WAS ABLE TO UPDATE HER ON HER FATHERS CARE. NO QUESTIONS WHEN ASKED. WILL CONTINUE TO UPDATE FAMILY WITH ANY CHANGES IN PATIENTS STATUS

## 2022-09-28 NOTE — PLAN OF CARE
Rec'd call from Marielle from [facility], stating that they can take pt even though he is COVID positive on skilled services.    Called daughter to inform her and she and her  are in agreement.   Will get d/c orders.

## 2022-10-10 NOTE — DISCHARGE SUMMARY
Discharge Summary   [facility]           Patient Name: Blaze Turner  : 1931  Age: 89 y.o.  MRN: [id number]    Admit Date: 2022  8:15 PM  Discharge Date: 2022 12:45 PM     Admitting Physician:  Cyndy Franco MD   Attending Physician: Gus Hewitt MD  Discharge Physician: Gus Hewitt MD  Primary Care Physician: Gaudencio Munguia MD      Discharge Diagnoses:  Final Active Diagnoses:    Diagnosis Date Noted POA    PRINCIPAL PROBLEM:  Closed fracture of right hip [S72.001A] 2022 Yes     Chronic    COVID-19 virus detected [U07.1] 2022 Clinically Undetermined    Encephalopathy, metabolic [G93.41] 2022 No     Chronic    Paroxysmal A-fib [I48.0] 2022 Yes     Chronic    Aspiration pneumonitis [J69.0] 2022 Yes     Chronic    DNR (do not resuscitate) [Z66] 2022 Yes     Chronic    Chronic pulmonary aspiration [T17.908A] 2022 Yes     Chronic    Malnutrition of moderate degree [E44.0] 2022 Yes     Chronic    Dementia [F03.90] 2022 Yes     Chronic    Rash [R21] 2022 No      Problems Resolved During this Admission:       Consults:   Orthopedic Surgery: Alex Sesay MD    Procedures :   No admission procedures for hospital encounter.     CC: Fall (Pt fell earlier today, initially c/o knee pain, currently c/o right hip pain. No shortening or rotation noted. ), Patient denies any complaints of pain at this time. (Patient is postop day 1 TFN left hip fracture.), and Follow-up on hip fracture       HPI:  89-year-old male with hx Dementia,  HTN, Trigeminal neuralgia, Afib, CKD stage 3 baseline Cr 1.5, brought in by his son after fall at home patient states he could not bear weight he was complaining of significant right hip pain so the son brought him to the emergency department.  No LOC, no chest pain, no F/C, no chest pain per daughter. PT takesTrileptal 150 mg daily per daughter. Pt has developed rash for  past 5 days per daughter who stays with him. Pt was scratching and agitated he was give Ativan in Ed, now sleeping. Daughter at bedside. Pt is DNR per daughter        Hospital Course:     9/18/22  Pt has remained lethargic and unarousable since surgery yesterday. Seem to attempt to wake up and mumbles with rough stimulation. Labs were ordered but not drawn this morning. Labs reordered and currently pending.    9/19/22  Upon entering room, son was holding emesis basin for pt as he was sitting up reclined leaning over to right with audible gurgling and spitting up a bit; no real vomitus. He was also coughing as well but not forceful enough to be effective. Son states pt has been awake and alert this morning. He ate a little breakfast and had just eaten some muffin f/b water. Son states RT then came in and had pt use IS and that's when he thinks he sucked in some food and began gagging and coughing. RT states pt had gurgling noises before IS attempted and wasn't able to perform d/t incoordination. RT then deep suctioned using Yonkuer and pt cleared.    9/20/22.   patient can be somnolent.  Family stated that episodes of emesis at home and questionable choking with liquids.  This has been happened several times at home   Continue to monitor patient closely  Will have speech to evaluate to deter will be the safest food take by mouth.  Discussed case with family members and eventually they are not recommending any PEG tube placement at this time   Will work with  toward a skilled nursing facility treatment.  09/21/2022.    Patient continues to be somnolent.    Speech evaluation persistent with aspirations not good swelling.  Chest x-ray confirms patient aspirates   Keep patient NPO   Hold IV fluid since patient has been on Lasix in the past questionable history of congestion CHF  Will check proBNP in the morning repeat chest x-ray   Start patient on Zosyn for aspiration pneumonia.    Discussed case with family  and .  Most likely this will be an LTAC case.  Patient is do not resuscitate code status her patient's and family member.    9/22/22.    Patient more awake alert today.  Follows few commands.  Still anxious and confused.    Discussed case with family members.    Will start patient on low-dose IV fluid.  Follow up with speech.    Regardless of outcome family members do not want patient to have a PEG.      9/23/22.    No significant changes compared to yesterday.    Discussed with family plan.  Since no need for PEG placement per patient's request we were not going to have a swallow study done and modified barium.    will try to treat patient with thickened liquids as tolerated.  Family are willing to consider alternative medical treatment including comfort measures.  Will continue IV antibiotics   continue nebulizer treatment  Follow-up the next 24-48 hours.    9/24/22.    Patient awake.  Alert follows few commands.  Still confused overall.  Family at bedside  Since not eating for a while family has advised to start something p.o. despite patient risk of aspiration.    They do not advise any artificial feeding including PPN or PEG tube feeding.    Continue gentle hydration IV.  Thickened liquid and pureed diet.  Follow up in the morning   Continue IV antibiotic.    9/25/22  Patient doing the same. Min PO yesterday. Still episodes of aspiration.   Will continue IV antibiotic and dc in am.   Family request no artificial feeding at this point.     9/26/22.    Patient more awake and alert today.    Continue current hospital treatment.  PT OT for the hip surgery.    Discussed with family and  disposition.  Patient is DNR but family not agreeable to hospice at this point    9/27/22  No new complaints or acute events. Alert. Pt was +COVID on screening for SNF, now unable to go for 10 more days.     9/28/22  No new complaints. Feels well. Discharged to [facility] in stable  condition.    Significant Diagnostic Studies: See Full reports for all details       X-Ray Chest 1 View    Result Date: 9/21/2022  EXAMINATION: XR CHEST 1 VIEW CLINICAL HISTORY: SOB; Possible Fluid Overload;, . COMPARISON: 09/20/2022 FINDINGS: An AP view or more reveals the heart to be mildly enlarged.  The trachea is to the right of midline.  There is patchy hazy opacification of the right lower lung.  The patient is rotated on the exam.  Degenerative changes and curvature are noted to the thoracic spine.  Bony structures are osteopenic.     1. Mild cardiomegaly 2. Patchy hazy infiltrate and or atelectasis right lower lung 3. Thoracic spondylosis and scoliosis well as osteopenia Electronically signed by: Tim Odonnell Date:    09/21/2022 Time:    09:29    X-Ray Chest 1 View    Result Date: 9/20/2022  EXAMINATION: XR CHEST 1 VIEW CLINICAL HISTORY: poss. aspiration;, . COMPARISON: 03/23/2022 FINDINGS: An AP view or more reveals heart to be borderline enlarged.  Patient rotated exam.  There is prominence and tortuosity of the aortic arch.  Inspiration is less than optimal.  The chin/has partially obscuring the right upper and left upper lung.  Atelectasis and/or scarring is evident the lower lungs.  No consolidative infiltrate or effusion is seen.  Degenerative changes are noted to the thoracic spine.  Bony structures are osteopenic.     1. Significantly limited exam secondary to suboptimal positioning 2. Borderline cardiomegaly 3. Atelectasis and/or scarring lower lungs bilaterally 4. Thoracic spondylosis 5. Osteopenia Electronically signed by: Tim Odonnell Date:    09/20/2022 Time:    13:59    X-Ray Hip 2 or 3 views Right (with Pelvis when performed)    Result Date: 9/17/2022  EXAMINATION: XR HIP WITH PELVIS WHEN PERFORMED, 2 OR 3  VIEWS RIGHT CLINICAL HISTORY: Unspecified fall, initial encounter TECHNIQUE: AP view of the pelvis and frog leg lateral view of the right hip were performed. COMPARISON: None FINDINGS:  Concern for an impacted subcapital fracture of the right femur with joint space narrowing.     Concern for fracture of the subcapital region of the right femur. Electronically signed by: Nelson Nunez MD Date:    09/17/2022 Time:    10:12    Fl Modified Barium Swallow Speech    Result Date: 9/21/2022  See procedure notes from Speech Pathologist. This procedure was auto-finalized.    X-Ray Chest AP Portable    Result Date: 9/26/2022  EXAMINATION: XR CHEST AP PORTABLE CLINICAL HISTORY: Follow-up pneumonia;, . COMPARISON: 09/23/2022 FINDINGS: An AP view or more reveals the heart to be borderline enlarged.  There is continued hazy opacification of the right lower lung.  There is suspect mild mediastinal and cardiac shift to the right although there is patient rotation.  Degenerative changes are noted to the thoracic spine.  The trachea is to the right of midline.     1. Persistent hazy opacification right lower lung suspicious for infiltrate and/or pleural reaction 2. Borderline cardiomegaly 3. Suspect mild mediastinal and cardiac shift to the right although there is patient rotation 4. Thoracic spondylosis Electronically signed by: Tim Odonnell Date:    09/26/2022 Time:    08:52    X-Ray Chest AP Portable    Result Date: 9/23/2022  EXAMINATION: XR CHEST AP PORTABLE CLINICAL HISTORY: f/u pneumonia;, . COMPARISON: 09/21/2022 FINDINGS: An AP view or more reveals the heart to be borderline enlarged.  There is mediastinal cardiac shift to the right.  The patient is rotated on the exam.  There is hazy opacification of the right lower lung.  Degenerative changes and curvature of the thoracic spine.  Bony structures are osteopenic.     1. Hazy opacification right lower lung 2. Mediastinal cardiac shift to the right with patient rotated on the exam 3. Atherosclerosis 4. Thoracic spondylosis and scoliosis 5. Osteopenia Electronically signed by: Tim Odonnell Date:    09/23/2022 Time:    09:54    X-Ray Chest AP Portable    Result  Date: 9/22/2022  EXAMINATION: XR CHEST AP PORTABLE CLINICAL HISTORY: aspiration pneumonia.;, . COMPARISON: 09/21/2022 FINDINGS: An AP view or more reveals the heart to be borderline enlarged.  There is mild improved aeration of the right lower lung with with residual patchy hazy density.  Trachea is to the right of midline.  The patient is rotated on the exam.  Degenerative changes are evident at the thoracic spine.  Bony structures are mildly osteopenic.     1. Borderline cardiomegaly 2. Mild improved aeration at the right lower lung with residual infiltrate and or atelectasis 3. Atherosclerosis 4. Thoracic spondylosis Electronically signed by: Tim Odonnell Date:    09/22/2022 Time:    09:03    X-Ray Chest AP Portable    Result Date: 9/21/2022  EXAMINATION: XR CHEST AP PORTABLE CLINICAL HISTORY: aspiration;, . COMPARISON: 09/21/2022 FINDINGS: An AP view or more reveals the heart to be borderline enlarged.  There is patchy hazy opacification of the right lower lung.  The trachea is mostly midline.  Bony structures are osteopenic.  Degenerative changes and curvature are noted to the thoracic spine.  The left lung remains relatively clear.     1. Borderline cardiomegaly 2. Patchy infiltrate and or atelectasis right lower lung 3. Thoracic spondylosis, scoliosis, and osteopenia Electronically signed by: Tim Odonnell Date:    09/21/2022 Time:    09:30    X-Ray Pelvis Routine AP    Result Date: 9/17/2022  EXAMINATION: XR PELVIS ROUTINE AP CLINICAL HISTORY: post op;post op; TECHNIQUE: AP view of the pelvis was performed. COMPARISON: Minute [id number] FINDINGS: Status post right bipolar hip prosthesis.  No other fracture deformity of the pelvis or proximal left femur is identified.     Expected postsurgical changes from right bipolar total hip arthroplasty. Electronically signed by: Nelson Nunez MD Date:    09/17/2022 Time:    16:12    X-Ray Pelvis Routine AP    Result Date: 9/17/2022  EXAMINATION: XR PELVIS ROUTINE AP  "CLINICAL HISTORY: Unspecified fall, initial encounter TECHNIQUE: AP view of the pelvis was performed. COMPARISON: None. FINDINGS: No fracture of the pelvis or proximal femora.  The femoral heads are well seated in the acetabulum with joint space narrowing. Degenerative changes of the bilateral SI joints pubic symphysis.     No fracture of the pelvis or proximal femora. Electronically signed by: Nelson Nunez MD Date:    09/17/2022 Time:    10:14             Physical Exam:  Blood pressure 128/68, pulse 87, temperature 97.8 °F (36.6 °C), temperature source Oral, resp. rate 18, height 5' 10" (1.778 m), weight 67.6 kg (149 lb 0.5 oz), SpO2 99 %.    Physical Exam  Constitutional:       Appearance: He is well-developed. He is not ill-appearing.   HENT:      Head: Normocephalic and atraumatic.      Nose: Nose normal.      Mouth/Throat:      Mouth: Mucous membranes are moist.      Pharynx: Oropharynx is clear.   Eyes:      Extraocular Movements: Extraocular movements intact.      Conjunctiva/sclera: Conjunctivae normal.   Cardiovascular:      Rate and Rhythm: Normal rate and regular rhythm.      Heart sounds: No murmur heard.  Pulmonary:      Effort: Pulmonary effort is normal.      Breath sounds: Normal breath sounds. No wheezing, rhonchi or rales.   Abdominal:      General: Abdomen is flat. Bowel sounds are normal.      Palpations: Abdomen is soft.   Musculoskeletal:      Right lower leg: No edema.      Left lower leg: No edema.   Skin:     General: Skin is warm and dry.   Neurological:      Mental Status: He is alert.             Discharge Instructions:  Activity: activity as tolerated  Diet: cardiac diet  Discharge Medications:    (Pharmacy: [unfilled])     Medication List        STOP taking these medications      furosemide 20 MG tablet  Commonly known as: LASIX     OXcarbazepine 150 MG Tab  Commonly known as: TRILEPTAL            Follow-Up:   Follow-up Information       Gaudencio Munguia MD Follow up.    Specialty: " Family Medicine  Contact information:  [address]  [place] [address]  [phone]                                 Disposition: Skilled Nursing Facility  Condition at Discharge: stable      Time spent on discharge = 33 minutes            Gus Hewitt MD  [facility]  [facility]

## 2024-02-18 NOTE — PLAN OF CARE
Problem: Infection  Goal: Absence of Infection Signs and Symptoms  Outcome: Ongoing, Progressing     Problem: Adult Inpatient Plan of Care  Goal: Plan of Care Review  Outcome: Ongoing, Progressing  Goal: Patient-Specific Goal (Individualized)  Outcome: Ongoing, Progressing  Goal: Absence of Hospital-Acquired Illness or Injury  Outcome: Ongoing, Progressing  Goal: Optimal Comfort and Wellbeing  Outcome: Ongoing, Progressing  Goal: Readiness for Transition of Care  Outcome: Ongoing, Progressing     Problem: Skin Injury Risk Increased  Goal: Skin Health and Integrity  Outcome: Ongoing, Progressing     Problem: Fall Injury Risk  Goal: Absence of Fall and Fall-Related Injury  Outcome: Ongoing, Progressing      TELEMETRY

## 2024-08-09 NOTE — CARE UPDATE
Called to pt room for a consult order sats 74% on RA. B/S coarse rhonchi heard t/o checked ETCO2 19 RR28 pt appears in distress. Placed pt on 15l oxymask and went to RN and placed call to hospitialist gave recommendations and got orders for stat CXR, 20mg LASIX, and to stop the IV. Cont. Pulse ox placed on pt  
Dropped to 10Lpm holding 98-99% will continue to wean O2.  
Yes

## (undated) DEVICE — SYR 50CC LL

## (undated) DEVICE — GOWN POLY REINF BRTH SLV XL

## (undated) DEVICE — SOL IRR SOD CHL .9% POUR

## (undated) DEVICE — PACK TOTAL JOINT TJLSB 659 SH

## (undated) DEVICE — DRAPE HIP PCH 112X137X89IN

## (undated) DEVICE — SOL NACL IRR 3000ML

## (undated) DEVICE — GLOVE PROTEXIS BLUE LATEX 7.5

## (undated) DEVICE — DURAPREP SURG SCRUB 26ML

## (undated) DEVICE — DRESSING AQUACEL AG FOAM 4X4

## (undated) DEVICE — CONTAINER SPECIMEN SCREW 4OZ

## (undated) DEVICE — KIT PULSAVAC PLUS HIP

## (undated) DEVICE — SUT VICRYL 2-0 J589H 27IN

## (undated) DEVICE — GLOVE PROTEXIS HYDROGEL SZ6.5

## (undated) DEVICE — PAD ELECTROSURGICAL SPL W/CORD

## (undated) DEVICE — GLOVE PROTEXIS HYDROGEL SZ7.5

## (undated) DEVICE — DRAPE INCISE IOBAN 2 23X33IN

## (undated) DEVICE — SOL IRRI STRL WATER 1000ML

## (undated) DEVICE — DRAPE STERI U-SHAPED 47X51IN

## (undated) DEVICE — GLOVE PROTEXIS BLUE LATEX 7

## (undated) DEVICE — STAPLER SKIN ROTATING HEAD

## (undated) DEVICE — DRESSING AQUACEL AG 3.5X10IN

## (undated) DEVICE — SOCKINETTE IMPERVIOS 16X48IN

## (undated) DEVICE — SUT VCRL TP 1/2 CIR CT 40MM

## (undated) DEVICE — GLOVE PROTEXIS LTX 6.5

## (undated) DEVICE — WRAP COBAN NL STRL 4INX5YD

## (undated) DEVICE — BLADE SAW LG BONE 1.47X25X90MM

## (undated) DEVICE — ELECTRODE EXTENDED BLADE